# Patient Record
Sex: FEMALE | Race: WHITE | NOT HISPANIC OR LATINO | Employment: OTHER | URBAN - METROPOLITAN AREA
[De-identification: names, ages, dates, MRNs, and addresses within clinical notes are randomized per-mention and may not be internally consistent; named-entity substitution may affect disease eponyms.]

---

## 2018-10-01 DIAGNOSIS — R29.898 BILATERAL LEG WEAKNESS: Primary | ICD-10-CM

## 2019-01-12 ENCOUNTER — HOSPITAL ENCOUNTER (EMERGENCY)
Facility: HOSPITAL | Age: 84
Discharge: PSYCHIATRIC OR SUBSTANCE USE FACILITY | End: 2019-01-13
Attending: EMERGENCY MEDICINE
Payer: COMMERCIAL

## 2019-01-12 DIAGNOSIS — F22 DELUSIONS: ICD-10-CM

## 2019-01-12 DIAGNOSIS — Z00.8 MEDICAL CLEARANCE FOR PSYCHIATRIC ADMISSION: ICD-10-CM

## 2019-01-12 DIAGNOSIS — F03.91 DEMENTIA WITH BEHAVIORAL DISTURBANCE, UNSPECIFIED DEMENTIA TYPE: Primary | ICD-10-CM

## 2019-01-12 LAB
BASOPHILS # BLD AUTO: 0.03 K/UL
BASOPHILS NFR BLD: 0.4 %
DIFFERENTIAL METHOD: ABNORMAL
EOSINOPHIL # BLD AUTO: 0.3 K/UL
EOSINOPHIL NFR BLD: 4.3 %
ERYTHROCYTE [DISTWIDTH] IN BLOOD BY AUTOMATED COUNT: 13.1 %
GLUCOSE SERPL-MCNC: 125 MG/DL (ref 70–110)
HCT VFR BLD AUTO: 37.1 %
HGB BLD-MCNC: 12.1 G/DL
LYMPHOCYTES # BLD AUTO: 1.8 K/UL
LYMPHOCYTES NFR BLD: 26.4 %
MCH RBC QN AUTO: 31 PG
MCHC RBC AUTO-ENTMCNC: 32.6 G/DL
MCV RBC AUTO: 95 FL
MONOCYTES # BLD AUTO: 1 K/UL
MONOCYTES NFR BLD: 15.3 %
NEUTROPHILS # BLD AUTO: 3.6 K/UL
NEUTROPHILS NFR BLD: 53.6 %
PLATELET # BLD AUTO: 253 K/UL
PMV BLD AUTO: 10.1 FL
POCT GLUCOSE: 125 MG/DL (ref 70–110)
RBC # BLD AUTO: 3.9 M/UL
WBC # BLD AUTO: 6.67 K/UL

## 2019-01-12 PROCEDURE — 80053 COMPREHEN METABOLIC PANEL: CPT

## 2019-01-12 PROCEDURE — 93010 EKG 12-LEAD: ICD-10-PCS | Mod: ,,, | Performed by: INTERNAL MEDICINE

## 2019-01-12 PROCEDURE — 93010 ELECTROCARDIOGRAM REPORT: CPT | Mod: ,,, | Performed by: INTERNAL MEDICINE

## 2019-01-12 PROCEDURE — 84443 ASSAY THYROID STIM HORMONE: CPT

## 2019-01-12 PROCEDURE — 85025 COMPLETE CBC W/AUTO DIFF WBC: CPT

## 2019-01-12 PROCEDURE — 82962 GLUCOSE BLOOD TEST: CPT

## 2019-01-12 PROCEDURE — 93005 ELECTROCARDIOGRAM TRACING: CPT

## 2019-01-12 PROCEDURE — 99285 EMERGENCY DEPT VISIT HI MDM: CPT

## 2019-01-12 PROCEDURE — 80320 DRUG SCREEN QUANTALCOHOLS: CPT

## 2019-01-13 VITALS
BODY MASS INDEX: 29.26 KG/M2 | HEART RATE: 57 BPM | DIASTOLIC BLOOD PRESSURE: 60 MMHG | WEIGHT: 159 LBS | RESPIRATION RATE: 18 BRPM | OXYGEN SATURATION: 97 % | HEIGHT: 62 IN | SYSTOLIC BLOOD PRESSURE: 156 MMHG | TEMPERATURE: 98 F

## 2019-01-13 LAB
ALBUMIN SERPL BCP-MCNC: 3.5 G/DL
ALP SERPL-CCNC: 167 U/L
ALT SERPL W/O P-5'-P-CCNC: 13 U/L
AMPHET+METHAMPHET UR QL: NEGATIVE
ANION GAP SERPL CALC-SCNC: 12 MMOL/L
AST SERPL-CCNC: 17 U/L
BACTERIA #/AREA URNS HPF: ABNORMAL /HPF
BARBITURATES UR QL SCN>200 NG/ML: NEGATIVE
BENZODIAZ UR QL SCN>200 NG/ML: NEGATIVE
BILIRUB SERPL-MCNC: 0.4 MG/DL
BILIRUB UR QL STRIP: NEGATIVE
BUN SERPL-MCNC: 22 MG/DL
BZE UR QL SCN: NEGATIVE
CALCIUM SERPL-MCNC: 9.7 MG/DL
CANNABINOIDS UR QL SCN: NEGATIVE
CHLORIDE SERPL-SCNC: 102 MMOL/L
CLARITY UR: CLEAR
CO2 SERPL-SCNC: 25 MMOL/L
COLOR UR: YELLOW
CREAT SERPL-MCNC: 0.9 MG/DL
CREAT UR-MCNC: 58.4 MG/DL
EST. GFR  (AFRICAN AMERICAN): >60 ML/MIN/1.73 M^2
EST. GFR  (NON AFRICAN AMERICAN): 57 ML/MIN/1.73 M^2
ETHANOL SERPL-MCNC: <10 MG/DL
GLUCOSE SERPL-MCNC: 126 MG/DL
GLUCOSE UR QL STRIP: NEGATIVE
HGB UR QL STRIP: NEGATIVE
KETONES UR QL STRIP: NEGATIVE
LEUKOCYTE ESTERASE UR QL STRIP: ABNORMAL
METHADONE UR QL SCN>300 NG/ML: NEGATIVE
MICROSCOPIC COMMENT: ABNORMAL
NITRITE UR QL STRIP: NEGATIVE
OPIATES UR QL SCN: NEGATIVE
PCP UR QL SCN>25 NG/ML: NEGATIVE
PH UR STRIP: 6 [PH] (ref 5–8)
POTASSIUM SERPL-SCNC: 4 MMOL/L
PROT SERPL-MCNC: 6 G/DL
PROT UR QL STRIP: NEGATIVE
SODIUM SERPL-SCNC: 139 MMOL/L
SP GR UR STRIP: 1.01 (ref 1–1.03)
SQUAMOUS #/AREA URNS HPF: 5 /HPF
TOXICOLOGY INFORMATION: NORMAL
TSH SERPL DL<=0.005 MIU/L-ACNC: 2.23 UIU/ML
URN SPEC COLLECT METH UR: ABNORMAL
UROBILINOGEN UR STRIP-ACNC: NEGATIVE EU/DL
WBC #/AREA URNS HPF: 20 /HPF (ref 0–5)
WBC CLUMPS URNS QL MICRO: ABNORMAL

## 2019-01-13 PROCEDURE — 81000 URINALYSIS NONAUTO W/SCOPE: CPT | Mod: 59

## 2019-01-13 PROCEDURE — 80307 DRUG TEST PRSMV CHEM ANLYZR: CPT

## 2019-01-13 PROCEDURE — 87086 URINE CULTURE/COLONY COUNT: CPT

## 2019-01-13 RX ORDER — LATANOPROST/PF 0.005 %
DROPS OPHTHALMIC (EYE) NIGHTLY
COMMUNITY

## 2019-01-13 RX ORDER — DULOXETIN HYDROCHLORIDE 20 MG/1
20 CAPSULE, DELAYED RELEASE ORAL DAILY
COMMUNITY

## 2019-01-13 RX ORDER — LORATADINE 10 MG/1
10 TABLET ORAL DAILY
COMMUNITY

## 2019-01-13 RX ORDER — RISPERIDONE 0.25 MG/1
TABLET ORAL NIGHTLY
COMMUNITY

## 2019-01-13 RX ORDER — METOPROLOL SUCCINATE 100 MG/1
100 TABLET, EXTENDED RELEASE ORAL DAILY
COMMUNITY

## 2019-01-13 RX ORDER — DIFLUPREDNATE OPHTHALMIC 0.5 MG/ML
1 EMULSION OPHTHALMIC
COMMUNITY

## 2019-01-13 RX ORDER — ROPINIROLE 1 MG/1
1 TABLET, FILM COATED ORAL 2 TIMES DAILY
COMMUNITY

## 2019-01-13 RX ORDER — LOSARTAN POTASSIUM 100 MG/1
100 TABLET ORAL DAILY
COMMUNITY

## 2019-01-13 NOTE — ED NOTES
Speaking to daughter Ann on phone. Medications reviewed with her. POC reviewed with daughter. Will send updated medication rec with patient when transported to [facility].     She asked to call son- Branden Mira at [phone] if unable to reach her. 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 Please call her son, Branden Mira if unable to reach her via phone.

## 2019-01-13 NOTE — ED NOTES
PATIENT WAS TRANSFERRED TO [facility] VIA SPD AND ESCORTED OUT BY SECURITY

## 2019-01-13 NOTE — ED NOTES
Admit packet faxed to [facility], [facility], [facility], and [facility],Admit packet faxed to [facility], [facility], [facility] [place], [facility], [facility], [facility], [facility], [facility], , [facility], [facility], [facility], [facility], [facility], [facility], [facility], [facility], [facility], [facility], [facility], [facility], [facility], [facility], [facility], [facility], [place] [facility],and [facility].  Waiting for response.

## 2019-01-13 NOTE — ED NOTES
Hx obtained from daughter, Ann on phone. Pt has been walking around at home without her walker and has been increasingly agitated and hallucinating-declining over the past several months. Pt wears hearing aids- hearing aids were taken home. 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 Speak into right ear, right ear is better ear. 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

## 2019-01-13 NOTE — ED NOTES
Report received from MARINA Sanders. Pt is alert and oriented at this time, calm and appropriate. Kelly, sitting 1:1 direct observation. Side rails up x2, call bell in place. Pt is medically cleared so she will be removed from cardiac monitor. Pt asked for breakfast, advised that tray would be coming and she can eat. Advised pt that she needs to be placed and we are awaiting placement at this time.

## 2019-01-13 NOTE — ED PROVIDER NOTES
Encounter Date: 1/12/2019       History     Chief Complaint   Patient presents with    Altered Mental Status     pt has hx dementia; reports last night from about [id number] in the morning the pt was walking around the house being loud and verbally abusive; when she woke up she was calm; at about 1800 this evening she began to show signs of aggression and altered mental status; daughter reports that the pt's grandson was able to give the pt her meds shortly after the start of her symptoms this evening; pt presents calm and cooperative at triage     Patient brought in for evaluation for psychiatric clearance for placement in a geriatric psych facility.  Patient has a history of dementia.  She has been having trouble with agitation for last 2 days.  She has not been sleeping at night.  She is currently cough because her grandson was able to get her to take her Risperdal tonight.  She has been refusing to take medications last few days.  Patient does not have any specific complaints. There has been no reports of fever, cough, abdominal pain, chest pain, urinary symptoms, or trauma. She denies headache.          Review of patient's allergies indicates:   Allergen Reactions    Qualaquin [quinine sulfate] Hives     Past Medical History:   Diagnosis Date    Asthma     Depression     Hyperlipidemia     Hypertension     Pneumonia     Renal disorder     Restless leg syndrome     Thyroid disease      Past Surgical History:   Procedure Laterality Date    CARPAL TUNNEL RELEASE      CHOLECYSTECTOMY      EGD (ESOPHAGOGASTRODUODENOSCOPY) Left 4/17/2013    [facility]    EYE SURGERY      Cataract    HYSTERECTOMY      TONSILLECTOMY       No family history on file.  Social History     Tobacco Use    Smoking status: Never Smoker   Substance Use Topics    Alcohol use: No    Drug use: No     Review of Systems   Constitutional: Negative for chills and fever.   HENT: Negative for  congestion, rhinorrhea and trouble swallowing.    Eyes: Negative for visual disturbance.   Respiratory: Negative for cough, shortness of breath and stridor.    Cardiovascular: Negative for chest pain.   Gastrointestinal: Negative for abdominal pain, blood in stool, diarrhea, nausea and vomiting.        No melena.   Genitourinary: Negative for dysuria, flank pain, hematuria and urgency.   Skin: Negative for rash and wound.   Neurological: Negative for dizziness, tremors, seizures, syncope, facial asymmetry, speech difficulty, weakness, numbness and headaches.   Psychiatric/Behavioral: Positive for agitation, behavioral problems, confusion and sleep disturbance. Negative for hallucinations and suicidal ideas.   All other systems reviewed and are negative.      Physical Exam     Initial Vitals [01/12/19 2313]   BP Pulse Resp Temp SpO2   (!) 199/78 64 19 98.1 °F (36.7 °C) 99 %      MAP       --         Physical Exam    Nursing note and vitals reviewed.  Constitutional: She appears well-developed and well-nourished. She is not diaphoretic. No distress.   HENT:   Head: Normocephalic and atraumatic.   Nose: Nose normal.   Mouth/Throat: Oropharynx is clear and moist.   Eyes: Conjunctivae and EOM are normal. Pupils are equal, round, and reactive to light. Right eye exhibits no discharge. Left eye exhibits no discharge.   Neck: Normal range of motion. Neck supple. No thyromegaly present.   Cardiovascular: Normal rate, regular rhythm and normal heart sounds.   No murmur heard.  Pulmonary/Chest: Breath sounds normal. No stridor. No respiratory distress. She has no wheezes. She has no rhonchi. She has no rales. She exhibits no tenderness.   Abdominal: Soft. She exhibits no distension and no mass. There is no tenderness. There is no rebound and no guarding.   Musculoskeletal: Normal range of motion. She exhibits no edema or tenderness.   Neurological: She is alert. She has normal strength. No cranial nerve deficit.   Patient  oriented to person and place.   Skin: Skin is warm and dry. No rash noted. No erythema.   Psychiatric: Her behavior is normal.   Patient currently calm and  cooperative.  No suicidal homicidal ideations.  Dysphoric mood.  Labile affect. No active hallucinations.  Daughter reports active delusions.         ED Course   Procedures  Labs Reviewed   CBC W/ AUTO DIFFERENTIAL   COMPREHENSIVE METABOLIC PANEL   TSH   URINALYSIS, REFLEX TO URINE CULTURE   DRUG SCREEN PANEL, URINE EMERGENCY   ALCOHOL,MEDICAL (ETHANOL)   POCT GLUCOSE MONITORING CONTINUOUS     EKG Readings: (Independently Interpreted)   Initial Reading: No STEMI. Rhythm: Sinus Bradycardia. Heart Rate: 56. Ectopy: No Ectopy. Conduction: 1st Degree AV Block. ST Segments: Normal ST Segments. T Waves: Normal. Axis: Normal.       Imaging Results          CT Head Without Contrast (In process)                  Medical Decision Making:   Differential Diagnosis:   Dementia, delirium.  Clinical Tests:   Lab Tests: Reviewed  The following lab test(s) were unremarkable: CBC, CMP and Urinalysis  Medical Tests: Reviewed  ED Management:  Patient medically cleared for psychiatric placement.                      Clinical Impression:   The primary encounter diagnosis was Dementia with behavioral disturbance, unspecified dementia type. Diagnoses of Medical clearance for psychiatric admission and Delusions were also pertinent to this visit.      Disposition:   Disposition: Transferred  Condition: Stable                        Chad Ramirez MD  01/13/19 3416

## 2019-01-13 NOTE — ED NOTES
Patient accepted by [facility] at [facility] ([address] [place] [address]) for the service of .Report to be called to [phone].

## 2019-01-13 NOTE — ED TRIAGE NOTES
Pt reports to ED with reports of aggressive behavior towards family members. Pt was recently diagnosed with dementia and hospitalized at [facility]. Pts daughter at bedside and reports pt is uncooperative with her care at home, refusing to go to the bathroom or take her medication. Pt appears to be upset with her daughter.

## 2019-01-13 NOTE — PSYCH
PEC received by CPT for placement. Packet will be faxed to [facility] facilities for acceptance. ED will be notified with report information thereafter

## 2019-01-15 LAB — BACTERIA UR CULT: NORMAL

## 2019-01-19 ENCOUNTER — HOSPITAL ENCOUNTER (EMERGENCY)
Facility: OTHER | Age: 84
Discharge: HOME | End: 2019-01-19
Attending: EMERGENCY MEDICINE
Payer: MEDICARE

## 2019-01-19 VITALS
WEIGHT: 132 LBS | HEIGHT: 60 IN | HEART RATE: 61 BPM | BODY MASS INDEX: 25.91 KG/M2 | RESPIRATION RATE: 18 BRPM | TEMPERATURE: 98 F | OXYGEN SATURATION: 97 % | SYSTOLIC BLOOD PRESSURE: 133 MMHG | DIASTOLIC BLOOD PRESSURE: 60 MMHG

## 2019-01-19 DIAGNOSIS — R60.9 SWELLING: ICD-10-CM

## 2019-01-19 DIAGNOSIS — M25.532 WRIST PAIN, ACUTE, LEFT: Primary | ICD-10-CM

## 2019-01-19 DIAGNOSIS — M25.439 WRIST SWELLING: ICD-10-CM

## 2019-01-19 PROCEDURE — 99283 EMERGENCY DEPT VISIT LOW MDM: CPT

## 2019-01-19 RX ORDER — CEPHALEXIN 500 MG/1
500 CAPSULE ORAL EVERY 12 HOURS
Qty: 20 CAPSULE | Refills: 0 | Status: ON HOLD | OUTPATIENT
Start: 2019-01-19 | End: 2019-01-25 | Stop reason: HOSPADM

## 2019-01-19 NOTE — ED PROVIDER NOTES
Encounter Date: 1/19/2019       History     Chief Complaint   Patient presents with    Wrist Pain     Reports left wrist pain and swelling, noticed yesterday after coming home from [facility]      89-year-old female with history of asthma, pneumonia, hypertension, depression, arthritis, thyroid disease, renal disorder, hyperlipidemia, restless leg syndrome and dementia presents to the emergency department with complaints of left wrist pain, swelling and redness.  Patient's daughter states that she picked her up yesterday from [facility] in [place].  Unsure of any injury. Patient states that she thinks it was injured secondary to her be mean to people.  She denies any numbness or weakness.  Patient's daughter states that she complained about it all day yesterday and again this morning.  She denies any fever, chills or streaking.  Denies history of gout.  No current treatment.      The history is provided by the patient and a relative.     Review of patient's allergies indicates:   Allergen Reactions    Qualaquin [quinine sulfate] Hives     Past Medical History:   Diagnosis Date    Asthma     Depression     Hyperlipidemia     Hypertension     Pneumonia     Renal disorder     Restless leg syndrome     Thyroid disease      Past Surgical History:   Procedure Laterality Date    CARPAL TUNNEL RELEASE      CHOLECYSTECTOMY      dementia      EGD (ESOPHAGOGASTRODUODENOSCOPY) Left 4/17/2013    [facility] ENDO    EYE SURGERY      Cataract    HYSTERECTOMY      TONSILLECTOMY       No family history on file.  Social History     Tobacco Use    Smoking status: Never Smoker    Smokeless tobacco: Never Used   Substance Use Topics    Alcohol use: No    Drug use: No     Review of Systems   Constitutional: Negative for chills and fever.   HENT: Negative for sore throat.    Respiratory: Negative for shortness of breath.    Cardiovascular: Negative for chest pain.   Gastrointestinal:  Negative for nausea and vomiting.   Musculoskeletal: Positive for arthralgias and joint swelling. Negative for back pain.        Left wrist pain, swelling and redness   Skin: Positive for color change. Negative for rash and wound.   Neurological: Negative for weakness and numbness.   Hematological: Does not bruise/bleed easily.       Physical Exam     Initial Vitals [01/19/19 1027]   BP Pulse Resp Temp SpO2   133/60 61 18 98 °F (36.7 °C) 97 %      MAP       --         Physical Exam    Nursing note and vitals reviewed.  Constitutional: Vital signs are normal. She appears well-developed and well-nourished.  Non-toxic appearance. No distress.   HENT:   Head: Normocephalic and atraumatic.   Right Ear: External ear normal.   Left Ear: External ear normal.   Nose: Nose normal.   Eyes: Conjunctivae and lids are normal. No scleral icterus.   Neck: Phonation normal.   Cardiovascular: Normal rate, regular rhythm and intact distal pulses.   Abdominal: Normal appearance.   Musculoskeletal: Normal range of motion.   No obvious deformities, moving all extremities  Left wrist- swelling, erythema and mild warmth noted to the left wrist.  Pain with range of motion.  She does have some mild tenderness to palpation.  Intact distal pulses with no sensory deficits.  Capillary refill less than 3 sec.   strength 5/5.  No bony deformity noted.   Neurological: She is alert and oriented to person, place, and time. She has normal strength. No sensory deficit. She exhibits normal muscle tone.   Skin: Skin is warm, dry and intact. Capillary refill takes less than 2 seconds. No abrasion, no bruising, no ecchymosis, no laceration, no lesion, no rash and no abscess noted. There is erythema (Left wrist).   Psychiatric: She has a normal mood and affect. Her speech is normal and behavior is normal. Judgment normal. Cognition and memory are normal.         ED Course   Procedures  Labs Reviewed - No data to display       Imaging Results           X-Ray Wrist Complete Left (Final result)  Result time 01/19/19 11:25:05    Final result by Sunny Taylor III, MD (01/19/19 11:25:05)                 Impression:      See above      Electronically signed by: Sunny Taylor MD  Date:    01/19/2019  Time:    11:25             Narrative:    EXAMINATION:  XR WRIST COMPLETE 3 VIEWS LEFT    CLINICAL HISTORY:  Edema, unspecified    FINDINGS:  No fracture dislocation bone destruction seen.  There is mild-moderate DJD.                                 Medical Decision Making:   History:   I obtained history from: someone other than patient.       <> Summary of History: Daughter  Old Medical Records: I decided to obtain old medical records.  Initial Assessment:   89-year-old female with complaints consistent with left wrist pain with swelling and erythema.  Vital signs stable, afebrile, neurovascularly intact.  She is alert and healthy and nontoxic appearing.  She is in no apparent distress. Exam documented above.  Patient has erythema, warmth, edema and pain to the left dorsal wrist.  No erythema noted to the volar aspect of the wrist.  She reports pain with range of motion and palpation. Reports possible injury however patient has history of dementia.  Patient's daughter states that she was not present at time of reported possible injury. She states that she was complaining of pain yesterday but had no redness at the time or swelling.  Clinical Tests:   Radiological Study: Reviewed  ED Management:  X-ray was obtained from triage with no evidence of fracture dislocation.  Discussed with the attending physician who also evaluated patient.  We do not feel that patient would tolerate joint aspiration at this time.  Will treat conservatively with Ace wrap, ice and Keflex.  She is urged close follow-up with her doctor in the next 48 hr or return to emergency department for any worsening signs or symptoms.  They state understanding and agree with this plan.  This is the extent of  patient's complaints today.  Other:   I have discussed this case with another health care provider.       <> Summary of the Discussion: John  This note was created using [employer] dictation.  There may be typographical errors secondary to dictation.                        Clinical Impression:     1. Wrist pain, acute, left    2. Swelling    3. Wrist swelling            Disposition:   Disposition: Discharged  Condition: Stable                        Earnestine Cheng PA-C  01/19/19 1218

## 2019-01-20 ENCOUNTER — HOSPITAL ENCOUNTER (OUTPATIENT)
Facility: HOSPITAL | Age: 84
Discharge: NURSING, REHAB OR LONG-TERM CARE | End: 2019-01-25
Attending: EMERGENCY MEDICINE | Admitting: HOSPITALIST
Payer: COMMERCIAL

## 2019-01-20 DIAGNOSIS — R55 SYNCOPE: ICD-10-CM

## 2019-01-20 DIAGNOSIS — R00.0 TACHYCARDIA: ICD-10-CM

## 2019-01-20 DIAGNOSIS — R55 SYNCOPE, UNSPECIFIED SYNCOPE TYPE: Primary | ICD-10-CM

## 2019-01-20 DIAGNOSIS — R11.10 VOMITING: ICD-10-CM

## 2019-01-20 PROBLEM — M25.532 WRIST PAIN, ACUTE, LEFT: Status: ACTIVE | Noted: 2019-01-20

## 2019-01-20 PROBLEM — E03.9 HYPOTHYROID: Status: ACTIVE | Noted: 2019-01-20

## 2019-01-20 PROBLEM — E11.9 TYPE 2 DIABETES MELLITUS: Status: ACTIVE | Noted: 2019-01-20

## 2019-01-20 PROBLEM — H26.9 CATARACTS, BILATERAL: Status: ACTIVE | Noted: 2019-01-20

## 2019-01-20 PROBLEM — F03.90 DEMENTIA: Status: ACTIVE | Noted: 2019-01-20

## 2019-01-20 LAB
ALBUMIN SERPL BCP-MCNC: 3.1 G/DL
ALP SERPL-CCNC: 165 U/L
ALT SERPL W/O P-5'-P-CCNC: 11 U/L
ANION GAP SERPL CALC-SCNC: 12 MMOL/L
ASCENDING AORTA: 2.5 CM
AST SERPL-CCNC: 17 U/L
AV INDEX (PROSTH): 0.7
AV MEAN GRADIENT: 8.15 MMHG
AV PEAK GRADIENT: 14.9 MMHG
AV VALVE AREA: 2.1 CM2
AV VELOCITY RATIO: 0.7
BASOPHILS # BLD AUTO: 0.02 K/UL
BASOPHILS NFR BLD: 0.2 %
BILIRUB SERPL-MCNC: 0.8 MG/DL
BILIRUB UR QL STRIP: NEGATIVE
BNP SERPL-MCNC: 69 PG/ML
BSA FOR ECHO PROCEDURE: 1.59 M2
BUN SERPL-MCNC: 26 MG/DL
CALCIUM SERPL-MCNC: 10.6 MG/DL
CHLORIDE SERPL-SCNC: 100 MMOL/L
CLARITY UR REFRACT.AUTO: CLEAR
CO2 SERPL-SCNC: 24 MMOL/L
COLOR UR AUTO: YELLOW
CREAT SERPL-MCNC: 0.8 MG/DL
CV ECHO LV RWT: 0.47 CM
DIFFERENTIAL METHOD: ABNORMAL
DOP CALC AO PEAK VEL: 1.93 M/S
DOP CALC AO VTI: 39.29 CM
DOP CALC LVOT AREA: 2.98 CM2
DOP CALC LVOT DIAMETER: 1.95 CM
DOP CALC LVOT PEAK VEL: 1.36 M/S
DOP CALC LVOT STROKE VOLUME: 82.41 CM3
DOP CALCLVOT PEAK VEL VTI: 27.61 CM
E WAVE DECELERATION TIME: 213.16 MSEC
E/A RATIO: 1.05
E/E' RATIO: 13.14
ECHO LV POSTERIOR WALL: 0.84 CM (ref 0.6–1.1)
EOSINOPHIL # BLD AUTO: 0 K/UL
EOSINOPHIL NFR BLD: 0.3 %
ERYTHROCYTE [DISTWIDTH] IN BLOOD BY AUTOMATED COUNT: 12.5 %
EST. GFR  (AFRICAN AMERICAN): >60 ML/MIN/1.73 M^2
EST. GFR  (NON AFRICAN AMERICAN): >60 ML/MIN/1.73 M^2
FRACTIONAL SHORTENING: 30 % (ref 28–44)
GLUCOSE SERPL-MCNC: 161 MG/DL
GLUCOSE UR QL STRIP: NEGATIVE
HCT VFR BLD AUTO: 38.1 %
HGB BLD-MCNC: 11.9 G/DL
HGB UR QL STRIP: NEGATIVE
IMM GRANULOCYTES # BLD AUTO: 0.08 K/UL
IMM GRANULOCYTES NFR BLD AUTO: 0.8 %
INTERVENTRICULAR SEPTUM: 0.6 CM (ref 0.6–1.1)
IVRT: 0.08 MSEC
KETONES UR QL STRIP: ABNORMAL
LA MAJOR: 5.3 CM
LA MINOR: 5.3 CM
LA WIDTH: 4.4 CM
LEFT ATRIUM SIZE: 4 CM
LEFT ATRIUM VOLUME INDEX: 50.7 ML/M2
LEFT ATRIUM VOLUME: 79.29 CM3
LEFT INTERNAL DIMENSION IN SYSTOLE: 2.49 CM (ref 2.1–4)
LEFT VENTRICLE DIASTOLIC VOLUME INDEX: 33.77 ML/M2
LEFT VENTRICLE DIASTOLIC VOLUME: 52.83 ML
LEFT VENTRICLE MASS INDEX: 42.9 G/M2
LEFT VENTRICLE SYSTOLIC VOLUME INDEX: 14.1 ML/M2
LEFT VENTRICLE SYSTOLIC VOLUME: 21.99 ML
LEFT VENTRICULAR INTERNAL DIMENSION IN DIASTOLE: 3.56 CM (ref 3.5–6)
LEFT VENTRICULAR MASS: 67.06 G
LEUKOCYTE ESTERASE UR QL STRIP: NEGATIVE
LV LATERAL E/E' RATIO: 13.14
LV SEPTAL E/E' RATIO: 13.14
LYMPHOCYTES # BLD AUTO: 0.4 K/UL
LYMPHOCYTES NFR BLD: 4.1 %
MCH RBC QN AUTO: 29.6 PG
MCHC RBC AUTO-ENTMCNC: 31.2 G/DL
MCV RBC AUTO: 95 FL
MONOCYTES # BLD AUTO: 0.8 K/UL
MONOCYTES NFR BLD: 8.3 %
MV PEAK A VEL: 0.88 M/S
MV PEAK E VEL: 0.92 M/S
NEUTROPHILS # BLD AUTO: 8.4 K/UL
NEUTROPHILS NFR BLD: 86.3 %
NITRITE UR QL STRIP: NEGATIVE
NRBC BLD-RTO: 0 /100 WBC
PH UR STRIP: 6 [PH] (ref 5–8)
PISA TR MAX VEL: 3.02 M/S
PLATELET # BLD AUTO: 235 K/UL
PMV BLD AUTO: 10.8 FL
POTASSIUM SERPL-SCNC: 4.2 MMOL/L
PROT SERPL-MCNC: 7 G/DL
PROT UR QL STRIP: NEGATIVE
PULM VEIN S/D RATIO: 2.16
PV PEAK D VEL: 0.44 M/S
PV PEAK S VEL: 0.95 M/S
RA MAJOR: 5.15 CM
RA PRESSURE: 3 MMHG
RA WIDTH: 3.68 CM
RBC # BLD AUTO: 4.02 M/UL
RIGHT VENTRICULAR END-DIASTOLIC DIMENSION: 3.55 CM
RV TISSUE DOPPLER FREE WALL SYSTOLIC VELOCITY 1 (APICAL 4 CHAMBER VIEW): 19.35 M/S
SINUS: 3.31 CM
SODIUM SERPL-SCNC: 136 MMOL/L
SP GR UR STRIP: 1.02 (ref 1–1.03)
STJ: 2.67 CM
TDI LATERAL: 0.07
TDI SEPTAL: 0.07
TDI: 0.07
TR MAX PG: 36.48 MMHG
TRICUSPID ANNULAR PLANE SYSTOLIC EXCURSION: 1.61 CM
TROPONIN I SERPL DL<=0.01 NG/ML-MCNC: <0.006 NG/ML
TROPONIN I SERPL DL<=0.01 NG/ML-MCNC: <0.006 NG/ML
TV REST PULMONARY ARTERY PRESSURE: 39 MMHG
URN SPEC COLLECT METH UR: ABNORMAL
WBC # BLD AUTO: 9.68 K/UL

## 2019-01-20 PROCEDURE — 93010 EKG 12-LEAD: ICD-10-PCS | Mod: ,,, | Performed by: INTERNAL MEDICINE

## 2019-01-20 PROCEDURE — G0378 HOSPITAL OBSERVATION PER HR: HCPCS

## 2019-01-20 PROCEDURE — 99219 PR INITIAL OBSERVATION CARE,LEVL II: ICD-10-PCS | Mod: ,,, | Performed by: PHYSICIAN ASSISTANT

## 2019-01-20 PROCEDURE — 25000003 PHARM REV CODE 250: Performed by: PHYSICIAN ASSISTANT

## 2019-01-20 PROCEDURE — 99219 PR INITIAL OBSERVATION CARE,LEVL II: CPT | Mod: ,,, | Performed by: PHYSICIAN ASSISTANT

## 2019-01-20 PROCEDURE — 80053 COMPREHEN METABOLIC PANEL: CPT

## 2019-01-20 PROCEDURE — 85025 COMPLETE CBC W/AUTO DIFF WBC: CPT

## 2019-01-20 PROCEDURE — 93010 ELECTROCARDIOGRAM REPORT: CPT | Mod: ,,, | Performed by: INTERNAL MEDICINE

## 2019-01-20 PROCEDURE — 99285 EMERGENCY DEPT VISIT HI MDM: CPT | Mod: 25

## 2019-01-20 PROCEDURE — 96361 HYDRATE IV INFUSION ADD-ON: CPT

## 2019-01-20 PROCEDURE — 63600175 PHARM REV CODE 636 W HCPCS: Performed by: PHYSICIAN ASSISTANT

## 2019-01-20 PROCEDURE — 82962 GLUCOSE BLOOD TEST: CPT

## 2019-01-20 PROCEDURE — 96374 THER/PROPH/DIAG INJ IV PUSH: CPT | Mod: 59

## 2019-01-20 PROCEDURE — 99284 PR EMERGENCY DEPT VISIT,LEVEL IV: ICD-10-PCS | Mod: ,,, | Performed by: PHYSICIAN ASSISTANT

## 2019-01-20 PROCEDURE — A4216 STERILE WATER/SALINE, 10 ML: HCPCS | Performed by: PHYSICIAN ASSISTANT

## 2019-01-20 PROCEDURE — 83880 ASSAY OF NATRIURETIC PEPTIDE: CPT

## 2019-01-20 PROCEDURE — 99284 EMERGENCY DEPT VISIT MOD MDM: CPT | Mod: ,,, | Performed by: PHYSICIAN ASSISTANT

## 2019-01-20 PROCEDURE — 81003 URINALYSIS AUTO W/O SCOPE: CPT

## 2019-01-20 PROCEDURE — 93005 ELECTROCARDIOGRAM TRACING: CPT

## 2019-01-20 PROCEDURE — 84484 ASSAY OF TROPONIN QUANT: CPT | Mod: 91

## 2019-01-20 RX ORDER — CEPHALEXIN 500 MG/1
500 CAPSULE ORAL
Status: COMPLETED | OUTPATIENT
Start: 2019-01-20 | End: 2019-01-20

## 2019-01-20 RX ORDER — POTASSIUM CHLORIDE 750 MG/1
10 CAPSULE, EXTENDED RELEASE ORAL DAILY
Status: DISCONTINUED | OUTPATIENT
Start: 2019-01-20 | End: 2019-01-25 | Stop reason: HOSPADM

## 2019-01-20 RX ORDER — LEVOTHYROXINE SODIUM 50 UG/1
50 TABLET ORAL DAILY
Status: DISCONTINUED | OUTPATIENT
Start: 2019-01-20 | End: 2019-01-25 | Stop reason: HOSPADM

## 2019-01-20 RX ORDER — PANTOPRAZOLE SODIUM 40 MG/1
40 TABLET, DELAYED RELEASE ORAL 2 TIMES DAILY
Status: DISCONTINUED | OUTPATIENT
Start: 2019-01-20 | End: 2019-01-25 | Stop reason: HOSPADM

## 2019-01-20 RX ORDER — IBUPROFEN 200 MG
24 TABLET ORAL
Status: DISCONTINUED | OUTPATIENT
Start: 2019-01-20 | End: 2019-01-25 | Stop reason: HOSPADM

## 2019-01-20 RX ORDER — ROPINIROLE 0.25 MG/1
1 TABLET, FILM COATED ORAL 2 TIMES DAILY
Status: DISCONTINUED | OUTPATIENT
Start: 2019-01-20 | End: 2019-01-25 | Stop reason: HOSPADM

## 2019-01-20 RX ORDER — DULOXETIN HYDROCHLORIDE 20 MG/1
20 CAPSULE, DELAYED RELEASE ORAL DAILY
Status: DISCONTINUED | OUTPATIENT
Start: 2019-01-20 | End: 2019-01-25 | Stop reason: HOSPADM

## 2019-01-20 RX ORDER — IBUPROFEN 200 MG
16 TABLET ORAL
Status: DISCONTINUED | OUTPATIENT
Start: 2019-01-20 | End: 2019-01-25 | Stop reason: HOSPADM

## 2019-01-20 RX ORDER — ACETAMINOPHEN 325 MG/1
650 TABLET ORAL
Status: COMPLETED | OUTPATIENT
Start: 2019-01-20 | End: 2019-01-20

## 2019-01-20 RX ORDER — CEPHALEXIN 500 MG/1
500 CAPSULE ORAL EVERY 12 HOURS
Status: COMPLETED | OUTPATIENT
Start: 2019-01-20 | End: 2019-01-25

## 2019-01-20 RX ORDER — LANOLIN ALCOHOL/MO/W.PET/CERES
400 CREAM (GRAM) TOPICAL DAILY
Status: DISCONTINUED | OUTPATIENT
Start: 2019-01-20 | End: 2019-01-25 | Stop reason: HOSPADM

## 2019-01-20 RX ORDER — BISACODYL 10 MG
10 SUPPOSITORY, RECTAL RECTAL DAILY PRN
Status: DISCONTINUED | OUTPATIENT
Start: 2019-01-20 | End: 2019-01-25 | Stop reason: HOSPADM

## 2019-01-20 RX ORDER — FERROUS SULFATE 325(65) MG
325 TABLET, DELAYED RELEASE (ENTERIC COATED) ORAL
Status: DISCONTINUED | OUTPATIENT
Start: 2019-01-21 | End: 2019-01-25 | Stop reason: HOSPADM

## 2019-01-20 RX ORDER — ONDANSETRON 8 MG/1
8 TABLET, ORALLY DISINTEGRATING ORAL EVERY 8 HOURS PRN
Status: DISCONTINUED | OUTPATIENT
Start: 2019-01-20 | End: 2019-01-25 | Stop reason: HOSPADM

## 2019-01-20 RX ORDER — RISPERIDONE 0.25 MG/1
0.25 TABLET ORAL NIGHTLY
Status: DISCONTINUED | OUTPATIENT
Start: 2019-01-20 | End: 2019-01-25 | Stop reason: HOSPADM

## 2019-01-20 RX ORDER — POLYETHYLENE GLYCOL 3350 17 G/17G
17 POWDER, FOR SOLUTION ORAL DAILY
Status: DISCONTINUED | OUTPATIENT
Start: 2019-01-20 | End: 2019-01-25 | Stop reason: HOSPADM

## 2019-01-20 RX ORDER — ACETAMINOPHEN 325 MG/1
650 TABLET ORAL EVERY 4 HOURS PRN
Status: DISCONTINUED | OUTPATIENT
Start: 2019-01-20 | End: 2019-01-25 | Stop reason: HOSPADM

## 2019-01-20 RX ORDER — HEPARIN SODIUM 5000 [USP'U]/ML
5000 INJECTION, SOLUTION INTRAVENOUS; SUBCUTANEOUS EVERY 8 HOURS
Status: DISCONTINUED | OUTPATIENT
Start: 2019-01-20 | End: 2019-01-25 | Stop reason: HOSPADM

## 2019-01-20 RX ORDER — GLUCAGON 1 MG
1 KIT INJECTION
Status: DISCONTINUED | OUTPATIENT
Start: 2019-01-20 | End: 2019-01-25 | Stop reason: HOSPADM

## 2019-01-20 RX ORDER — DIFLUPREDNATE OPHTHALMIC 0.5 MG/ML
1 EMULSION OPHTHALMIC NIGHTLY
Status: DISCONTINUED | OUTPATIENT
Start: 2019-01-20 | End: 2019-01-25 | Stop reason: HOSPADM

## 2019-01-20 RX ORDER — LOSARTAN POTASSIUM 50 MG/1
100 TABLET ORAL DAILY
Status: DISCONTINUED | OUTPATIENT
Start: 2019-01-20 | End: 2019-01-25 | Stop reason: HOSPADM

## 2019-01-20 RX ORDER — ALBUTEROL SULFATE 90 UG/1
2 AEROSOL, METERED RESPIRATORY (INHALATION) EVERY 6 HOURS PRN
Status: DISCONTINUED | OUTPATIENT
Start: 2019-01-20 | End: 2019-01-25 | Stop reason: HOSPADM

## 2019-01-20 RX ORDER — SODIUM CHLORIDE 0.9 % (FLUSH) 0.9 %
5 SYRINGE (ML) INJECTION
Status: DISCONTINUED | OUTPATIENT
Start: 2019-01-20 | End: 2019-01-25 | Stop reason: HOSPADM

## 2019-01-20 RX ORDER — ONDANSETRON 2 MG/ML
4 INJECTION INTRAMUSCULAR; INTRAVENOUS
Status: COMPLETED | OUTPATIENT
Start: 2019-01-20 | End: 2019-01-20

## 2019-01-20 RX ORDER — METOPROLOL SUCCINATE 50 MG/1
100 TABLET, EXTENDED RELEASE ORAL NIGHTLY
Status: DISCONTINUED | OUTPATIENT
Start: 2019-01-20 | End: 2019-01-25 | Stop reason: HOSPADM

## 2019-01-20 RX ORDER — INSULIN ASPART 100 [IU]/ML
0-5 INJECTION, SOLUTION INTRAVENOUS; SUBCUTANEOUS
Status: DISCONTINUED | OUTPATIENT
Start: 2019-01-20 | End: 2019-01-25 | Stop reason: HOSPADM

## 2019-01-20 RX ORDER — RAMELTEON 8 MG/1
8 TABLET ORAL NIGHTLY PRN
Status: DISCONTINUED | OUTPATIENT
Start: 2019-01-20 | End: 2019-01-25 | Stop reason: HOSPADM

## 2019-01-20 RX ADMIN — METOPROLOL SUCCINATE 100 MG: 50 TABLET, EXTENDED RELEASE ORAL at 09:01

## 2019-01-20 RX ADMIN — CEPHALEXIN 500 MG: 500 CAPSULE ORAL at 09:01

## 2019-01-20 RX ADMIN — LOSARTAN POTASSIUM 100 MG: 50 TABLET, FILM COATED ORAL at 03:01

## 2019-01-20 RX ADMIN — HEPARIN SODIUM 5000 UNITS: 5000 INJECTION, SOLUTION INTRAVENOUS; SUBCUTANEOUS at 03:01

## 2019-01-20 RX ADMIN — DULOXETINE HYDROCHLORIDE 20 MG: 20 CAPSULE, DELAYED RELEASE ORAL at 03:01

## 2019-01-20 RX ADMIN — PANTOPRAZOLE SODIUM 40 MG: 40 TABLET, DELAYED RELEASE ORAL at 09:01

## 2019-01-20 RX ADMIN — CEPHALEXIN 500 MG: 500 CAPSULE ORAL at 12:01

## 2019-01-20 RX ADMIN — ONDANSETRON 4 MG: 2 INJECTION INTRAMUSCULAR; INTRAVENOUS at 08:01

## 2019-01-20 RX ADMIN — LEVOTHYROXINE SODIUM 50 MCG: 50 TABLET ORAL at 03:01

## 2019-01-20 RX ADMIN — POTASSIUM CHLORIDE 10 MEQ: 750 CAPSULE, EXTENDED RELEASE ORAL at 03:01

## 2019-01-20 RX ADMIN — ROPINIROLE HYDROCHLORIDE 1 MG: 0.25 TABLET, FILM COATED ORAL at 09:01

## 2019-01-20 RX ADMIN — HEPARIN SODIUM 5000 UNITS: 5000 INJECTION, SOLUTION INTRAVENOUS; SUBCUTANEOUS at 09:01

## 2019-01-20 RX ADMIN — MAGNESIUM OXIDE TAB 400 MG (241.3 MG ELEMENTAL MG) 400 MG: 400 (241.3 MG) TAB at 03:01

## 2019-01-20 RX ADMIN — ACETAMINOPHEN 650 MG: 325 TABLET, FILM COATED ORAL at 11:01

## 2019-01-20 RX ADMIN — RISPERIDONE 0.25 MG: 0.25 TABLET ORAL at 09:01

## 2019-01-20 RX ADMIN — SODIUM CHLORIDE 500 ML: 0.9 INJECTION, SOLUTION INTRAVENOUS at 09:01

## 2019-01-20 RX ADMIN — Medication 5 ML: at 09:01

## 2019-01-20 NOTE — H&P
"[facility] Hospital Medicine  History & Physical    Patient Name: Gardenia Ferrer  MRN: [id number]  Admission Date: 1/20/2019  Attending Physician: Ching Argueta MD   Primary Care Provider: Nancy Reyes MD    [facility] Medicine Team: [facility] HOSP MED E Meli Yuan PA-C     Patient information was obtained from patient, caregiver / friend, past medical records and ER records.     Subjective:     Principal Problem:Syncope    Chief Complaint:   Chief Complaint   Patient presents with    Nausea     N/V after taking Keflex for Osteomyelitis.         HPI: Gardenia Ferrer is a 89F with dementia, asthma, DM2, HTN, a-fib (daughter reports one episode 4 years ago) who presents to ED for witnessed syncope. Daughter at bedside provides history. She states that yesterday the patient was brought to [facility] ED for left wrist pain associated with swelling/erythema. Wrist imaging was unremarkable and the patient was discharged with Keflex. This AM the daughter gave the pt her Keflex and shortly after heard her coughing in her room. After the coughing fit, the patient vomited and then went "unresponsive" for less than a minute. Daughter states that the patient did not endorse any complaints prior to the syncopal episode. Patient has no complaints at time of my exam. No fevers at home per daughter.    Of note, in ED caregiver is tearful when discussing her abilities to take care of the patient. At this time she is interested in placement for the patient.     VSS, afebrile without leukocytosis. EKG no new ischemic changes, trop neg x 2, BNP WNL. UA unremarkable. CXR unremarkable.    Past Medical History:   Diagnosis Date    Asthma     Depression     Hyperlipidemia     Hypertension     Pneumonia     Renal disorder     Restless leg syndrome     Thyroid disease        Past Surgical History:   Procedure Laterality Date    CARPAL TUNNEL RELEASE      CHOLECYSTECTOMY      dementia      EGD " (ESOPHAGOGASTRODUODENOSCOPY) Left 4/17/2013    [facility] ENDO    EYE SURGERY      Cataract    HYSTERECTOMY      TONSILLECTOMY         Review of patient's allergies indicates:   Allergen Reactions    Qualaquin [quinine sulfate] Hives       No current facility-administered medications on file prior to encounter.      Current Outpatient Medications on File Prior to Encounter   Medication Sig    albuterol (PROAIR HFA) 90 mcg/actuation HFAA Inhale 2 puffs into the lungs every 6 (six) hours as needed.    cephALEXin (KEFLEX) 500 MG capsule Take 1 capsule (500 mg total) by mouth every 12 (twelve) hours. for 10 days    difluprednate (DUREZOL) 0.05 % Drop ophthalmic solution 1 drop. 1 drop in Left eye evening    DULoxetine (CYMBALTA) 20 MG capsule Take 20 mg by mouth once daily.    ferrous sulfate 325 (65 FE) MG EC tablet Take 325 mg by mouth 3 (three) times a week. Mon, Wed, Fri    latanoprost, PF, 0.005 % Drop Apply to eye every evening. 1 drop in right eye at night    levothyroxine (SYNTHROID) 50 MCG tablet Take 50 mcg by mouth once daily.    loratadine (CLARITIN) 10 mg tablet Take 10 mg by mouth once daily.    losartan (COZAAR) 100 MG tablet Take 100 mg by mouth once daily.    magnesium oxide (MAG-OX) 400 mg tablet Take 1 tablet (400 mg total) by mouth 2 (two) times daily. (Patient taking differently: Take 400 mg by mouth once daily. )    metoprolol succinate (TOPROL-XL) 100 MG 24 hr tablet Take 100 mg by mouth once daily.    potassium chloride SA (K-DUR,KLOR-CON) 10 MEQ tablet Take 10 mEq by mouth once daily.     ranitidine (ZANTAC) 150 MG tablet Take 150 mg by mouth once daily.    risperiDONE (RISPERDAL) 0.25 MG Tab Take by mouth every evening.    rOPINIRole (REQUIP) 1 MG tablet Take 1 mg by mouth 2 (two) times daily.    pantoprazole (PROTONIX) 40 MG tablet Take 1 tablet (40 mg total) by mouth 2 (two) times daily.     Family History     None        Tobacco Use     "Smoking status: Never Smoker    Smokeless tobacco: Never Used   Substance and Sexual Activity    Alcohol use: No    Drug use: No    Sexual activity: Not on file     Review of Systems   Unable to perform ROS: Dementia     Objective:     Vital Signs (Most Recent):  Temp: 97.8 °F (36.6 °C) (01/20/19 1315)  Pulse: 76 (01/20/19 1342)  Resp: 16 (01/20/19 1342)  BP: (!) 169/74 (01/20/19 1342)  SpO2: 97 % (01/20/19 1342) Vital Signs (24h Range):  Temp:  [97.5 °F (36.4 °C)-98.5 °F (36.9 °C)] 97.8 °F (36.6 °C)  Pulse:  [66-94] 76  Resp:  [14-20] 16  SpO2:  [97 %-100 %] 97 %  BP: (121-169)/(58-74) 169/74     Weight: 59.9 kg (132 lb)  Body mass index is 25.78 kg/m².    Physical Exam   Constitutional: She appears well-developed and well-nourished. No distress.   HENT:   Head: Normocephalic and atraumatic.   Eyes: EOM are normal. Right eye exhibits no discharge. Left eye exhibits no discharge.   Neck: Normal range of motion.   Cardiovascular: Normal rate, regular rhythm and intact distal pulses.   Pulmonary/Chest: Effort normal and breath sounds normal.   Abdominal: Soft. Bowel sounds are normal. She exhibits no distension. There is no tenderness. There is no guarding.   Musculoskeletal: She exhibits tenderness.   Neurological: She is alert.   Oriented to first name (baseline)   Skin: Skin is warm. She is not diaphoretic. There is erythema.   Psychiatric: She has a normal mood and affect. Her behavior is normal.         CRANIAL NERVES     CN III, IV, VI   Extraocular motions are normal.        Significant Labs: All pertinent labs within the past 24 hours have been reviewed.    Significant Imaging: I have reviewed all pertinent imaging results/findings within the past 24 hours.    Assessment/Plan:     * Syncope    Dementia  Witnessed episode of emesis followed by syncope; daughter reports patient "was out for less than a minute"  - suspect emesis 2/2 new abx administration for L wrist swelling; likely vasovagal sycnope " "following emesis  - trop neg x 2  - UA and CXR unremarkable  - TTE ordered  - PTOT consulted; daughter is open to NH placement- will discuss with CM tomorrow     Wrist pain, acute, left    Pt seen at [facility] yesterday 1/19 per chart review: wrist imaging unremarkable, "suspect her pain is likely due to some sort of trauma, though infection is possibility"  - neurovascularly intact on exam; L wrist in ACE wrap  - afebrile without leukocytosis here  - continue keflex with PPI and Zofran  - PT/OT     Type 2 diabetes mellitus    Family reports diet controlled  A1c in AM  Low dose sliding scale       Cardiac rhythm disorder or disturbance or change    Pt daughter reports a-fib ~4 yrs ago; well controlled  - continue toprol 100mg qhs  - tele  EKG NSR     Hypokalemia    Hypomagnesemia  K+ 4.2; will check Mg in AM labs  - cont home supplements     Cataracts, bilateral    Continue home drops     Hypothyroid    Continue home synthroid  TSH, T4 in AM       VTE Risk Mitigation (From admission, onward)    None             Meli Yuan PA-C  Department of Hospital Medicine   [facility]  "

## 2019-01-20 NOTE — SUBJECTIVE & OBJECTIVE
Past Medical History:   Diagnosis Date    Asthma     Depression     Hyperlipidemia     Hypertension     Pneumonia     Renal disorder     Restless leg syndrome     Thyroid disease        Past Surgical History:   Procedure Laterality Date    CARPAL TUNNEL RELEASE      CHOLECYSTECTOMY      dementia      EGD (ESOPHAGOGASTRODUODENOSCOPY) Left 4/17/2013    Performed by Pillo Chin MD at [facility] ENDO    EYE SURGERY      Cataract    HYSTERECTOMY      TONSILLECTOMY         Review of patient's allergies indicates:   Allergen Reactions    Qualaquin [quinine sulfate] Hives       No current facility-administered medications on file prior to encounter.      Current Outpatient Medications on File Prior to Encounter   Medication Sig    albuterol (PROAIR HFA) 90 mcg/actuation HFAA Inhale 2 puffs into the lungs every 6 (six) hours as needed.    cephALEXin (KEFLEX) 500 MG capsule Take 1 capsule (500 mg total) by mouth every 12 (twelve) hours. for 10 days    difluprednate (DUREZOL) 0.05 % Drop ophthalmic solution 1 drop. 1 drop in Left eye evening    DULoxetine (CYMBALTA) 20 MG capsule Take 20 mg by mouth once daily.    ferrous sulfate 325 (65 FE) MG EC tablet Take 325 mg by mouth 3 (three) times a week. Mon, Wed, Fri    latanoprost, PF, 0.005 % Drop Apply to eye every evening. 1 drop in right eye at night    levothyroxine (SYNTHROID) 50 MCG tablet Take 50 mcg by mouth once daily.    loratadine (CLARITIN) 10 mg tablet Take 10 mg by mouth once daily.    losartan (COZAAR) 100 MG tablet Take 100 mg by mouth once daily.    magnesium oxide (MAG-OX) 400 mg tablet Take 1 tablet (400 mg total) by mouth 2 (two) times daily. (Patient taking differently: Take 400 mg by mouth once daily. )    metoprolol succinate (TOPROL-XL) 100 MG 24 hr tablet Take 100 mg by mouth once daily.    potassium chloride SA (K-DUR,KLOR-CON) 10 MEQ tablet Take 10 mEq by mouth once daily.     ranitidine (ZANTAC) 150 MG tablet Take 150 mg by  mouth once daily.    risperiDONE (RISPERDAL) 0.25 MG Tab Take by mouth every evening.    rOPINIRole (REQUIP) 1 MG tablet Take 1 mg by mouth 2 (two) times daily.    pantoprazole (PROTONIX) 40 MG tablet Take 1 tablet (40 mg total) by mouth 2 (two) times daily.     Family History     None        Tobacco Use    Smoking status: Never Smoker    Smokeless tobacco: Never Used   Substance and Sexual Activity    Alcohol use: No    Drug use: No    Sexual activity: Not on file     Review of Systems   Unable to perform ROS: Dementia     Objective:     Vital Signs (Most Recent):  Temp: 97.8 °F (36.6 °C) (01/20/19 1315)  Pulse: 76 (01/20/19 1342)  Resp: 16 (01/20/19 1342)  BP: (!) 169/74 (01/20/19 1342)  SpO2: 97 % (01/20/19 1342) Vital Signs (24h Range):  Temp:  [97.5 °F (36.4 °C)-98.5 °F (36.9 °C)] 97.8 °F (36.6 °C)  Pulse:  [66-94] 76  Resp:  [14-20] 16  SpO2:  [97 %-100 %] 97 %  BP: (121-169)/(58-74) 169/74     Weight: 59.9 kg (132 lb)  Body mass index is 25.78 kg/m².    Physical Exam   Constitutional: She appears well-developed and well-nourished. No distress.   HENT:   Head: Normocephalic and atraumatic.   Eyes: EOM are normal. Right eye exhibits no discharge. Left eye exhibits no discharge.   Neck: Normal range of motion.   Cardiovascular: Normal rate, regular rhythm and intact distal pulses.   Pulmonary/Chest: Effort normal and breath sounds normal.   Abdominal: Soft. Bowel sounds are normal. She exhibits no distension. There is no tenderness. There is no guarding.   Musculoskeletal: She exhibits tenderness.   Neurological: She is alert.   Oriented to first name (baseline)   Skin: Skin is warm. She is not diaphoretic. There is erythema.   Psychiatric: She has a normal mood and affect. Her behavior is normal.         CRANIAL NERVES     CN III, IV, VI   Extraocular motions are normal.        Significant Labs: All pertinent labs within the past 24 hours have been reviewed.    Significant Imaging: I have reviewed all  pertinent imaging results/findings within the past 24 hours.

## 2019-01-20 NOTE — ASSESSMENT & PLAN NOTE
Pt daughter reports a-fib ~4 yrs ago; well controlled  - continue toprol 100mg qhs  - tele  EKG NSR

## 2019-01-20 NOTE — HPI
"Gardenia Ferrer is a 89F with dementia, asthma, DM2, HTN, a-fib (daughter reports one episode 4 years ago) who presents to ED for witnessed syncope. Daughter at bedside provides history. She states that yesterday the patient was brought to [facility] ED for left wrist pain associated with swelling/erythema. Wrist imaging was unremarkable and the patient was discharged with Keflex. This AM the daughter gave the pt her Keflex and shortly after heard her coughing in her room. After the coughing fit, the patient vomited and then went "unresponsive" for less than a minute. Daughter states that the patient did not endorse any complaints prior to the syncopal episode. Patient has no complaints at time of my exam. No fevers at home per daughter.    Of note, in ED caregiver is tearful when discussing her abilities to take care of the patient. At this time she is interested in placement for the patient.     VSS, afebrile without leukocytosis. EKG no new ischemic changes, trop neg x 2, BNP WNL. UA unremarkable. CXR unremarkable.  "

## 2019-01-20 NOTE — ASSESSMENT & PLAN NOTE
"Pt seen at [facility] [place] operated by [facility] yesterday 1/19 per chart review: wrist imaging unremarkable, "suspect her pain is likely due to some sort of trauma, though infection is possibility"  - neurovascularly intact on exam; L wrist in ACE wrap  - afebrile without leukocytosis here  - continue keflex with PPI and Zofran  - PT/OT  "

## 2019-01-20 NOTE — ED NOTES
Tele box [id number] applied to pt. Zach in war room states able to see pt on monitor, rhythm NSR with HR 68.

## 2019-01-20 NOTE — PLAN OF CARE
Problem: Adult Inpatient Plan of Care  Goal: Plan of Care Review  Outcome: Ongoing (interventions implemented as appropriate)  Pt in bed resting at this time. Oriented to self only. Alert to voice and able to follow commands, however shows spurts of confusion. Was accompanied by daughter upon arrival. VSS as last charted in f/s. Tolerated medications well, diet in place as tolerated per PA. PIV c/d/i flushed/capped. Echo came to bedside for scheduled TTE. Pt tolerated well. No acute changes noted at this time. Will prepare report to oncoming shift nurse.

## 2019-01-20 NOTE — ED TRIAGE NOTES
Left ribs scatter bruising, left wrist tenderness and swelling, redness    Patient identifiers verified and correct for Gardenia Ferrer.    LOC: The patient is awake, alert and oriented x 4. Pt is speaking appropriately, no slurred speech.  APPEARANCE: Patient resting comfortably and in no acute distress. Pt is clean and well groomed. No JVD visible. Pt reports pain level of 5/10.  SKIN: Skin is warm dry and multiple bruises to left ribs and abdomen and right upper arm, and color is consistent with ethnicity. No tenting observed and capillary refill <3 seconds. No clubbing noted to nail beds. No breakdown. mucus membranes moist and acyanotic.  MUSCULOSKELETAL: left wrist restrictive movent due to pain swelling and redness  RESPIRATORY: Airway is open and patent. Respirations-unlabored, regular rate, equal bilaterally on inspiration and expiration. No accessory muscle use noted. Lungs clear to auscultation in all fields bilaterally anterior and posterior.   CARDIAC: Patient has regular heart rate and rhythm.  No peripheral edema noted, and patient has no c/o chest pain.  ABDOMEN: Soft and non-tender to palpation with no distention noted. Normoactive bowel sounds X4 quadrants. Pt has no complaints of abnormal bowel movements. Pt reports normal appetite.   NEUROLOGIC: Eyes open spontaneously and facial expression symmetrical. Pt behavior appropriate to situation, and pt follows commands.  Pt reports sensation present in all extremities when touched with a finger. No s/s of ischemic stroke. PERRLA  : No complaints of frequency, burning, urgency or blood in the urine.

## 2019-01-20 NOTE — ED PROVIDER NOTES
Encounter Date: 1/20/2019    SCRIBE #1 NOTE: I, Chao Perera, am scribing for, and in the presence of,  Dr. Ricardo. I have scribed the following portions of the note - the EKG reading.       History     Chief Complaint   Patient presents with    Nausea     N/V after taking Keflex for Osteomyelitis.      89-year-old female with HTN, thyroid disease, HLD, renal disorder, recent diagnosis of dementia with behavioral disturbance presents to the ED with her daughter for evaluation of a syncopal episode with nausea and vomiting. Daughter is the patient's primary caregiver and lives in patient's home. Patient's daughter reports that she heard the patient coughing in her room and found that the patient had vomited in her bed. Daughter also mentions that the patient was holding her lower abdomen and complaining of pain.  She then witnessed a syncopal episode while the patient was on her bed.  Patient was unconscious for less than 1 min.  Daughter reports that the patient was seen in the ED yesterday for wrist pain and swelling and placed on Keflex for a suspected wrist infection (no diagnosis of osteomyelitis was mentioned in ED note).  Daughter states that the antibiotic was delivered last night and the patient was given a dose at that time.  History from patient is limited secondary to dementia.  She has no longer complaining of nausea or vomiting.  She denies abdominal pain, chest pain, shortness of breath. Daughter denies any fevers.  Daughter reports no cardiac history, but reports that the patient had a run of AFib about 4 years ago.  Not currently anticoagulated.          Review of patient's allergies indicates:   Allergen Reactions    Qualaquin [quinine sulfate] Hives     Past Medical History:   Diagnosis Date    Asthma     Depression     Hyperlipidemia     Hypertension     Pneumonia     Renal disorder     Restless leg syndrome     Thyroid disease      Past Surgical History:   Procedure Laterality  Date    CARPAL TUNNEL RELEASE      CHOLECYSTECTOMY      dementia      EGD (ESOPHAGOGASTRODUODENOSCOPY) Left 4/17/2013    [facility] by [facility] at [facility] ENDO    EYE SURGERY      Cataract    HYSTERECTOMY      TONSILLECTOMY       History reviewed. No pertinent family history.  Social History     Tobacco Use    Smoking status: Never Smoker    Smokeless tobacco: Never Used   Substance Use Topics    Alcohol use: No    Drug use: No     Review of Systems   Unable to perform ROS: Dementia   Constitutional: Negative for fever.   Respiratory: Negative for shortness of breath.    Cardiovascular: Negative for chest pain.   Gastrointestinal: Positive for abdominal pain (resolved), nausea (resolved) and vomiting.   Neurological: Positive for syncope.       Physical Exam     Initial Vitals [01/20/19 0757]   BP Pulse Resp Temp SpO2   (!) 150/70 82 16 97.6 °F (36.4 °C) 97 %      MAP       --         Physical Exam    Nursing note and vitals reviewed.  Constitutional: She appears well-developed and well-nourished. She is not diaphoretic.  Non-toxic appearance. She does not appear ill. No distress.   HENT:   Head: Normocephalic and atraumatic.   Neck: Neck supple.   Cardiovascular: Normal rate and regular rhythm. Exam reveals no gallop and no friction rub.    No murmur heard.  Pulmonary/Chest: Effort normal and breath sounds normal. No accessory muscle usage. No tachypnea. No respiratory distress. She has no decreased breath sounds. She has no wheezes. She has no rhonchi. She has no rales.   Abdominal: Soft. She exhibits no distension. There is no tenderness.   Musculoskeletal:   Tenderness, mild swelling, mild erythema noted to the dorsal left wrist .   Neurological: She is alert.   No facial droop or extremity drift.   Skin: No rash noted.   Psychiatric: She has a normal mood and affect. Her behavior is normal.         ED Course   Procedures  Labs Reviewed   CBC W/ AUTO DIFFERENTIAL - Abnormal; Notable for the  following components:       Result Value    Hemoglobin 11.9 (*)     MCHC 31.2 (*)     Immature Granulocytes 0.8 (*)     Gran # (ANC) 8.4 (*)     Immature Grans (Abs) 0.08 (*)     Lymph # 0.4 (*)     Gran% 86.3 (*)     Lymph% 4.1 (*)     All other components within normal limits   COMPREHENSIVE METABOLIC PANEL - Abnormal; Notable for the following components:    Glucose 161 (*)     BUN, Bld 26 (*)     Calcium 10.6 (*)     Albumin 3.1 (*)     Alkaline Phosphatase 165 (*)     All other components within normal limits   URINALYSIS, REFLEX TO URINE CULTURE - Abnormal; Notable for the following components:    Ketones, UA Trace (*)     All other components within normal limits    Narrative:     Preferred Collection Type->Urine, Catheterized   TROPONIN I   B-TYPE NATRIURETIC PEPTIDE   TROPONIN I     EKG Readings: (Independently Interpreted)   Rhythm: Normal Sinus Rhythm. Heart Rate: 74.   Non-specific ST changes in lateral leads. No elevations or depressions. Unchanged when compared to January 12, 2019.       Imaging Results          X-Ray Chest AP Portable (Final result)  Result time 01/20/19 09:17:31    Final result by Wilmar Dhillon MD (01/20/19 09:17:31)                 Impression:      See above      Electronically signed by: Wilmar Dhillon MD  Date:    01/20/2019  Time:    09:17             Narrative:    EXAMINATION:  XR CHEST AP PORTABLE    CLINICAL HISTORY:  Vomiting, unspecified    TECHNIQUE:  Single frontal view of the chest was performed.    COMPARISON:  None    FINDINGS:  Mild cardiomegaly.  The lungs are clear.  No pleural effusion                                 Medical Decision Making:   History:   Old Medical Records: I decided to obtain old medical records.  Differential Diagnosis:   My differential diagnosis includes but is not limited to:  Vasovagal syncope, ACS, cardiac dysrhythmia, dehydration, anemia, electrolyte disturbance  Independently Interpreted Test(s):   I have ordered and independently  interpreted EKG Reading(s) - see prior notes  Clinical Tests:   Lab Tests: Reviewed and Ordered  Radiological Study: Ordered and Reviewed  Medical Tests: Reviewed and Ordered       APC / Resident Notes:   89-year-old female presents with syncope after an episode of vomiting this morning.  BP slightly elevated with systolic in 150 to 160s the.  Afebrile, no acute distress. Nonfocal neuro exam.  Abdomen soft and nontender. There is evidence of mild cellulitis noted to the dorsal left wrist.  Patient was started on Keflex for this yesterday.     WBC count 9.68 with a left shift.  Baseline typically around 6.  No significant anemia.  Mild hyperglycemia at 161.  Troponins x2 are within normal limits. EKG reveals no ischemic patterns or other life-threatening arrhythmia.  Chest x-ray without acute abnormalities.    Will place patient in observation for monitoring given her age and syncopal episode. I have reviewed the patient's records and discussed this case with my supervising physician.             Scribe Attestation:   Scribe #1: I performed the above scribed service and the documentation accurately describes the services I performed. I attest to the accuracy of the note.               Clinical Impression:   The primary encounter diagnosis was Syncope, unspecified syncope type. Diagnoses of Vomiting and Syncope were also pertinent to this visit.      Disposition:   Disposition: Placed in Observation  Condition: Mason Myers PA-C  01/20/19 0653

## 2019-01-20 NOTE — ASSESSMENT & PLAN NOTE
"Dementia  Witnessed episode of emesis followed by syncope; daughter reports patient "was out for less than a minute"  - suspect emesis 2/2 new abx administration for L wrist swelling; likely vasovagal sycnope following emesis  - trop neg x 2  - UA and CXR unremarkable  - TTE ordered  - PTOT consulted; daughter is open to NH placement- will discuss with CM tomorrow  "

## 2019-01-21 LAB
ANION GAP SERPL CALC-SCNC: 7 MMOL/L
BASOPHILS # BLD AUTO: 0.06 K/UL
BASOPHILS NFR BLD: 1 %
BUN SERPL-MCNC: 26 MG/DL
CALCIUM SERPL-MCNC: 10 MG/DL
CHLORIDE SERPL-SCNC: 102 MMOL/L
CO2 SERPL-SCNC: 26 MMOL/L
CREAT SERPL-MCNC: 0.8 MG/DL
DIFFERENTIAL METHOD: ABNORMAL
EOSINOPHIL # BLD AUTO: 0.5 K/UL
EOSINOPHIL NFR BLD: 8 %
ERYTHROCYTE [DISTWIDTH] IN BLOOD BY AUTOMATED COUNT: 12.9 %
EST. GFR  (AFRICAN AMERICAN): >60 ML/MIN/1.73 M^2
EST. GFR  (NON AFRICAN AMERICAN): >60 ML/MIN/1.73 M^2
ESTIMATED AVG GLUCOSE: 126 MG/DL
GLUCOSE SERPL-MCNC: 100 MG/DL
HBA1C MFR BLD HPLC: 6 %
HCT VFR BLD AUTO: 33.9 %
HGB BLD-MCNC: 10.6 G/DL
IMM GRANULOCYTES # BLD AUTO: 0.04 K/UL
IMM GRANULOCYTES NFR BLD AUTO: 0.7 %
LYMPHOCYTES # BLD AUTO: 1.2 K/UL
LYMPHOCYTES NFR BLD: 20.1 %
MAGNESIUM SERPL-MCNC: 1.5 MG/DL
MCH RBC QN AUTO: 29.9 PG
MCHC RBC AUTO-ENTMCNC: 31.3 G/DL
MCV RBC AUTO: 96 FL
MONOCYTES # BLD AUTO: 0.8 K/UL
MONOCYTES NFR BLD: 14.2 %
NEUTROPHILS # BLD AUTO: 3.2 K/UL
NEUTROPHILS NFR BLD: 56 %
NRBC BLD-RTO: 0 /100 WBC
PHOSPHATE SERPL-MCNC: 3.2 MG/DL
PLATELET # BLD AUTO: 255 K/UL
PMV BLD AUTO: 10.4 FL
POCT GLUCOSE: 101 MG/DL (ref 70–110)
POCT GLUCOSE: 113 MG/DL (ref 70–110)
POCT GLUCOSE: 116 MG/DL (ref 70–110)
POCT GLUCOSE: 119 MG/DL (ref 70–110)
POCT GLUCOSE: 93 MG/DL (ref 70–110)
POTASSIUM SERPL-SCNC: 4.3 MMOL/L
RBC # BLD AUTO: 3.54 M/UL
SODIUM SERPL-SCNC: 135 MMOL/L
T4 FREE SERPL-MCNC: 1.42 NG/DL
TSH SERPL DL<=0.005 MIU/L-ACNC: 2.33 UIU/ML
WBC # BLD AUTO: 5.78 K/UL

## 2019-01-21 PROCEDURE — 86580 TB INTRADERMAL TEST: CPT | Performed by: HOSPITALIST

## 2019-01-21 PROCEDURE — 85025 COMPLETE CBC W/AUTO DIFF WBC: CPT

## 2019-01-21 PROCEDURE — A4216 STERILE WATER/SALINE, 10 ML: HCPCS | Performed by: PHYSICIAN ASSISTANT

## 2019-01-21 PROCEDURE — 84443 ASSAY THYROID STIM HORMONE: CPT

## 2019-01-21 PROCEDURE — 63600175 PHARM REV CODE 636 W HCPCS: Performed by: HOSPITALIST

## 2019-01-21 PROCEDURE — 84439 ASSAY OF FREE THYROXINE: CPT

## 2019-01-21 PROCEDURE — 63600175 PHARM REV CODE 636 W HCPCS: Performed by: PHYSICIAN ASSISTANT

## 2019-01-21 PROCEDURE — 99225 PR SUBSEQUENT OBSERVATION CARE,LEVEL II: CPT | Mod: ,,, | Performed by: PHYSICIAN ASSISTANT

## 2019-01-21 PROCEDURE — 83735 ASSAY OF MAGNESIUM: CPT

## 2019-01-21 PROCEDURE — 36415 COLL VENOUS BLD VENIPUNCTURE: CPT

## 2019-01-21 PROCEDURE — G0378 HOSPITAL OBSERVATION PER HR: HCPCS

## 2019-01-21 PROCEDURE — 84100 ASSAY OF PHOSPHORUS: CPT

## 2019-01-21 PROCEDURE — 99225 PR SUBSEQUENT OBSERVATION CARE,LEVEL II: ICD-10-PCS | Mod: ,,, | Performed by: PHYSICIAN ASSISTANT

## 2019-01-21 PROCEDURE — 25000003 PHARM REV CODE 250: Performed by: PHYSICIAN ASSISTANT

## 2019-01-21 PROCEDURE — 83036 HEMOGLOBIN GLYCOSYLATED A1C: CPT

## 2019-01-21 PROCEDURE — 80048 BASIC METABOLIC PNL TOTAL CA: CPT

## 2019-01-21 RX ADMIN — DULOXETINE HYDROCHLORIDE 20 MG: 20 CAPSULE, DELAYED RELEASE ORAL at 09:01

## 2019-01-21 RX ADMIN — HEPARIN SODIUM 5000 UNITS: 5000 INJECTION, SOLUTION INTRAVENOUS; SUBCUTANEOUS at 03:01

## 2019-01-21 RX ADMIN — PANTOPRAZOLE SODIUM 40 MG: 40 TABLET, DELAYED RELEASE ORAL at 09:01

## 2019-01-21 RX ADMIN — HEPARIN SODIUM 5000 UNITS: 5000 INJECTION, SOLUTION INTRAVENOUS; SUBCUTANEOUS at 09:01

## 2019-01-21 RX ADMIN — Medication 5 ML: at 05:01

## 2019-01-21 RX ADMIN — Medication 5 UNITS: at 06:01

## 2019-01-21 RX ADMIN — CEPHALEXIN 500 MG: 500 CAPSULE ORAL at 09:01

## 2019-01-21 RX ADMIN — FERROUS SULFATE TAB EC 325 MG (65 MG FE EQUIVALENT) 325 MG: 325 (65 FE) TABLET DELAYED RESPONSE at 03:01

## 2019-01-21 RX ADMIN — ROPINIROLE HYDROCHLORIDE 1 MG: 0.25 TABLET, FILM COATED ORAL at 09:01

## 2019-01-21 RX ADMIN — LOSARTAN POTASSIUM 100 MG: 50 TABLET, FILM COATED ORAL at 09:01

## 2019-01-21 RX ADMIN — LEVOTHYROXINE SODIUM 50 MCG: 50 TABLET ORAL at 09:01

## 2019-01-21 RX ADMIN — POTASSIUM CHLORIDE 10 MEQ: 750 CAPSULE, EXTENDED RELEASE ORAL at 09:01

## 2019-01-21 RX ADMIN — MAGNESIUM OXIDE TAB 400 MG (241.3 MG ELEMENTAL MG) 400 MG: 400 (241.3 MG) TAB at 09:01

## 2019-01-21 RX ADMIN — METOPROLOL SUCCINATE 100 MG: 50 TABLET, EXTENDED RELEASE ORAL at 09:01

## 2019-01-21 RX ADMIN — HEPARIN SODIUM 5000 UNITS: 5000 INJECTION, SOLUTION INTRAVENOUS; SUBCUTANEOUS at 05:01

## 2019-01-21 NOTE — ASSESSMENT & PLAN NOTE
Hypomagnesemia  K+ 4.2; will check Mg in AM labs  - cont home supplements  1/21: Mg 1.5, continue home supplements for now

## 2019-01-21 NOTE — SUBJECTIVE & OBJECTIVE
Interval History: No acute events overnight. No further episodes of vomiting since admission. This AM patient is confused about where she is and is asking for her mother to come in. Later in the day, reassessed patient with daughter present at bedside. Patient's mentation stable from AM exam.    Review of Systems   Unable to perform ROS: Dementia     Objective:     Vital Signs (Most Recent):  Temp: 98.5 °F (36.9 °C) (01/21/19 1547)  Pulse: 74 (01/21/19 1551)  Resp: 18 (01/21/19 1547)  BP: 132/60 (01/21/19 1547)  SpO2: 95 % (01/21/19 1547) Vital Signs (24h Range):  Temp:  [97.8 °F (36.6 °C)-98.8 °F (37.1 °C)] 98.5 °F (36.9 °C)  Pulse:  [] 74  Resp:  [16-20] 18  SpO2:  [95 %-98 %] 95 %  BP: (124-148)/(59-84) 132/60     Weight: 59.9 kg (132 lb)  Body mass index is 25.78 kg/m².    Intake/Output Summary (Last 24 hours) at 1/21/2019 1707  Last data filed at 1/21/2019 1245  Gross per 24 hour   Intake 660 ml   Output --   Net 660 ml      Physical Exam   Constitutional: No distress.   HENT:   Head: Normocephalic.   Eyes: EOM are normal. Right eye exhibits no discharge. Left eye exhibits no discharge.   Neck: Normal range of motion.   Cardiovascular: Normal rate and regular rhythm.   Pulmonary/Chest: Effort normal. No respiratory distress.   Abdominal: Soft. Bowel sounds are normal. She exhibits no distension. There is no tenderness.   Musculoskeletal: She exhibits tenderness (L hand).   Neurological: She is alert.   Oriented to first name (baseline)   Skin: Skin is warm. She is not diaphoretic. There is erythema.   Psychiatric: She has a normal mood and affect. Her behavior is normal.       Significant Labs: All pertinent labs within the past 24 hours have been reviewed.    Significant Imaging: I have reviewed all pertinent imaging results/findings within the past 24 hours.

## 2019-01-21 NOTE — PLAN OF CARE
Problem: Adult Inpatient Plan of Care  Goal: Plan of Care Review  Outcome: Ongoing (interventions implemented as appropriate)  Plan of care continued per orders. Telemetry in progress. Pt turned every 2 hours with BLE elevated. Reassurance given. Fall precautions maintained. Call light in reach. Pt reoriented as tolerated. No complains of nausea after taking antibiotic.Comfort and safety maintained

## 2019-01-21 NOTE — PROGRESS NOTES
"[facility] Hospital Medicine  Progress Note    Patient Name: Gardenia Ferrer  MRN: [id number]  Patient Class: OP- Observation   Admission Date: 1/20/2019  Length of Stay: 0 days  Attending Physician: Ching Argueta MD  Primary Care Provider: [facility] Medicine Team: [facility] HOSP MED E Meli Yuan PA-C    Subjective:     Principal Problem:Syncope    HPI:  Gardenia Ferrer is a 89F with dementia, asthma, DM2, HTN, a-fib (daughter reports one episode 4 years ago) who presents to ED for witnessed syncope. Daughter at bedside provides history. She states that yesterday the patient was brought to [facility] ED for left wrist pain associated with swelling/erythema. Wrist imaging was unremarkable and the patient was discharged with Keflex. This AM the daughter gave the pt her Keflex and shortly after heard her coughing in her room. After the coughing fit, the patient vomited and then went "unresponsive" for less than a minute. Daughter states that the patient did not endorse any complaints prior to the syncopal episode. Patient has no complaints at time of my exam. No fevers at home per daughter.    Of note, in ED caregiver is tearful when discussing her abilities to take care of the patient. At this time she is interested in placement for the patient.     VSS, afebrile without leukocytosis. EKG no new ischemic changes, trop neg x 2, BNP WNL. UA unremarkable. CXR unremarkable.    Hospital Course:  Patient admitted for witnessed syncope. EKG no new ischemic changes and troponin WNL. TTE shows mod diastolic dysfunction with pEF 65%. Patient's daughter is sole caregiver and reports feeling overwhelmed at home with her mother's medical needs. She is agreeable to NH placement.     PT OT consulted. Medically stable, CM/SW aware of placement needs.    Interval History: No acute events overnight. No further episodes of vomiting since admission. This AM patient is confused " "about where she is and is asking for her mother to come in. Later in the day, reassessed patient with daughter present at bedside. Patient's mentation stable from AM exam.    Review of Systems   Unable to perform ROS: Dementia     Objective:     Vital Signs (Most Recent):  Temp: 98.5 °F (36.9 °C) (01/21/19 1547)  Pulse: 74 (01/21/19 1551)  Resp: 18 (01/21/19 1547)  BP: 132/60 (01/21/19 1547)  SpO2: 95 % (01/21/19 1547) Vital Signs (24h Range):  Temp:  [97.8 °F (36.6 °C)-98.8 °F (37.1 °C)] 98.5 °F (36.9 °C)  Pulse:  [] 74  Resp:  [16-20] 18  SpO2:  [95 %-98 %] 95 %  BP: (124-148)/(59-84) 132/60     Weight: 59.9 kg (132 lb)  Body mass index is 25.78 kg/m².    Intake/Output Summary (Last 24 hours) at 1/21/2019 1707  Last data filed at 1/21/2019 1245  Gross per 24 hour   Intake 660 ml   Output --   Net 660 ml      Physical Exam   Constitutional: No distress.   HENT:   Head: Normocephalic.   Eyes: EOM are normal. Right eye exhibits no discharge. Left eye exhibits no discharge.   Neck: Normal range of motion.   Cardiovascular: Normal rate and regular rhythm.   Pulmonary/Chest: Effort normal. No respiratory distress.   Abdominal: Soft. Bowel sounds are normal. She exhibits no distension. There is no tenderness.   Musculoskeletal: She exhibits tenderness (L hand).   Neurological: She is alert.   Oriented to first name (baseline)   Skin: Skin is warm. She is not diaphoretic. There is erythema.   Psychiatric: She has a normal mood and affect. Her behavior is normal.       Significant Labs: All pertinent labs within the past 24 hours have been reviewed.    Significant Imaging: I have reviewed all pertinent imaging results/findings within the past 24 hours.    Assessment/Plan:      * Syncope    Dementia  Witnessed episode of emesis followed by syncope; daughter reports patient "was out for less than a minute"  - suspect emesis 2/2 new abx administration for L wrist swelling; likely vasovagal sycnope following emesis  - " "trop neg x 2  - UA and CXR unremarkable  - TTE shows mild diastolic dysfx with pEF 65%  - PTOT consulted; daughter is open to NH placement- will discuss with CM tomorrow     Wrist pain, acute, left    Pt seen at [facility] yesterday 1/19 per chart review: wrist imaging unremarkable, "suspect her pain is likely due to some sort of trauma, though infection is possibility"  - neurovascularly intact on exam; L wrist in ACE wrap  - afebrile without leukocytosis here  - continue keflex with PPI and Zofran  - PT/OT     Type 2 diabetes mellitus    Family reports diet controlled  A1c 6  Low dose sliding scale  1/21: BG stable, cont monitor       Cardiac rhythm disorder or disturbance or change    Pt daughter reports a-fib ~4 yrs ago; well controlled  - continue toprol 100mg qhs  - tele  EKG NSR     Hypokalemia    Hypomagnesemia  K+ 4.2; will check Mg in AM labs  - cont home supplements  1/21: Mg 1.5, continue home supplements for now     Cataracts, bilateral    Continue home drops     Hypothyroid    Continue home synthroid  TSH, T4 WNL       VTE Risk Mitigation (From admission, onward)        Ordered     heparin (porcine) injection 5,000 Units  Every 8 hours      01/20/19 1452     IP VTE HIGH RISK PATIENT  Once      01/20/19 1452              Meli Yuan PA-C  Department of Hospital Medicine   [facility]  "

## 2019-01-21 NOTE — HOSPITAL COURSE
Patient admitted for witnessed syncope. EKG no new ischemic changes and troponin WNL. TTE shows mod diastolic dysfunction with pEF 65%. Patient's daughter is sole caregiver and reports feeling overwhelmed at home with her mother's medical needs. She is agreeable to NH placement. Erythema to left wrist improved and itching treated with hydrocortisone cream which also improved. Patient is participating in Pt/OT. Medically stable.

## 2019-01-21 NOTE — ASSESSMENT & PLAN NOTE
"Dementia  Witnessed episode of emesis followed by syncope; daughter reports patient "was out for less than a minute"  - suspect emesis 2/2 new abx administration for L wrist swelling; likely vasovagal sycnope following emesis  - trop neg x 2  - UA and CXR unremarkable  - TTE shows mild diastolic dysfx with pEF 65%  - PTOT consulted; daughter is open to NH placement- will discuss with CM tomorrow  "

## 2019-01-21 NOTE — PLAN OF CARE
Transfer from [facility] to [facility] (staff Dr Reid tomorrow)

## 2019-01-21 NOTE — PLAN OF CARE
Problem: Fall Injury Risk  Goal: Absence of Fall and Fall-Related Injury  Outcome: Ongoing (interventions implemented as appropriate)  No falls noted during the shift

## 2019-01-21 NOTE — NURSING
Pt moved from room 7 to 10 to be closer to the nurses station. Pt was scared and tearful upon assessment when in room 7. Pt states she likes room 10. Pt informed we are right outside her door so she is not alone and she can see people walking around which makes her happy. Plan for the night, orders and meds discussed. Pt is confused and difficult to orient. Pt forgets statements when attempting to reorient. Comfort and safety maintained. Call light in reach. Fall precautions maintained. Reassurance given.

## 2019-01-21 NOTE — PLAN OF CARE
01/21/19 1230   Discharge Assessment   Assessment Type Discharge Planning Assessment   Confirmed/corrected address and phone number on facesheet? Yes   Assessment information obtained from? Caregiver  (daughter, Ann Page ([phone]))   Expected Length of Stay (days) 3   Communicated expected length of stay with patient/caregiver yes   Prior to hospitilization cognitive status: Not Oriented to Time;Not Oriented to Place;Not Oriented to Person   Prior to hospitalization functional status: Wheelchair Bound   Current cognitive status: Not Oriented to Place;Not Oriented to Person   Current Functional Status: Wheelchair Bound   Lives With child(antony), adult  (adult daughter, Ann Page ([phone]))   Able to Return to Prior Arrangements no   Patient's perception of discharge disposition skilled nursing facility   Readmission Within the Last 30 Days no previous admission in last 30 days   Patient currently being followed by outpatient case management? No   Patient currently receives any other outside agency services? No   Equipment Currently Used at Home walker, standard;wheelchair;bedside commode;shower chair;grab bar;other (see comments)  (BP machine)   Do you have any problems affording any of your prescribed medications? No   Is the patient taking medications as prescribed? yes   Does the patient have transportation home? Yes   Transportation Anticipated health plan transportation   Does the patient receive [facility]? No   Discharge Plan A Skilled Nursing Facility   Discharge Plan B New Nursing Home placement - [facility] care facility   Patient/Family in Agreement with Plan yes     Dx: syncope  Consult: PT/OT  [facility]: [facility]  [facility] f[facility]u appt: [facility]    All discharge planning assessment information was obtained from the patient's daughter, Ann Page ([phone]), at the patient's bedside. Patient is currently receiving [facility] health care from [facility]. KENDRA Jamil ([phone]) w/[facility]  of patient's hospitalization. [facility] stated that the patient was discharged from their services on 12/28/19. Ann stated that she is unable to care for the patient at home & is interested in NH placement. Awaiting PT/OT recommendations for discharge needs. Patient recently enrolled in the [facility] program. Voicemail message left for YOLANDA Tamayo ([phone]) w/[facility] informing of the patient's hospitalization. CM informed YOLANDA Hall ([phone]) of above. CXR was done 1/20/19. PPD ordered. Will continue to follow.

## 2019-01-21 NOTE — PLAN OF CARE
Problem: Diabetes Comorbidity  Goal: Blood Glucose Level Within Desired Range  Outcome: Ongoing (interventions implemented as appropriate)  Blood glucose monitored before meals and at bedtime. Pt to be medicated with prn med if needed.

## 2019-01-21 NOTE — ASSESSMENT & PLAN NOTE
"Pt seen at [facility] yesterday 1/19 per chart review: wrist imaging unremarkable, "suspect her pain is likely due to some sort of trauma, though infection is possibility"  - neurovascularly intact on exam; L wrist in ACE wrap  - afebrile without leukocytosis here  - continue keflex with PPI and Zofran  - PT/OT  "

## 2019-01-22 PROBLEM — I10 ESSENTIAL HYPERTENSION: Status: ACTIVE | Noted: 2019-01-22

## 2019-01-22 LAB
ANION GAP SERPL CALC-SCNC: 4 MMOL/L
BASOPHILS # BLD AUTO: 0.06 K/UL
BASOPHILS NFR BLD: 1 %
BUN SERPL-MCNC: 19 MG/DL
CALCIUM SERPL-MCNC: 10.4 MG/DL
CHLORIDE SERPL-SCNC: 100 MMOL/L
CO2 SERPL-SCNC: 31 MMOL/L
CREAT SERPL-MCNC: 0.8 MG/DL
DIFFERENTIAL METHOD: ABNORMAL
EOSINOPHIL # BLD AUTO: 0.5 K/UL
EOSINOPHIL NFR BLD: 8.7 %
ERYTHROCYTE [DISTWIDTH] IN BLOOD BY AUTOMATED COUNT: 12.5 %
EST. GFR  (AFRICAN AMERICAN): >60 ML/MIN/1.73 M^2
EST. GFR  (NON AFRICAN AMERICAN): >60 ML/MIN/1.73 M^2
GLUCOSE SERPL-MCNC: 106 MG/DL
HCT VFR BLD AUTO: 35.5 %
HGB BLD-MCNC: 11.1 G/DL
IMM GRANULOCYTES # BLD AUTO: 0.03 K/UL
IMM GRANULOCYTES NFR BLD AUTO: 0.5 %
LYMPHOCYTES # BLD AUTO: 1.3 K/UL
LYMPHOCYTES NFR BLD: 22.8 %
MAGNESIUM SERPL-MCNC: 1.7 MG/DL
MCH RBC QN AUTO: 29.9 PG
MCHC RBC AUTO-ENTMCNC: 31.3 G/DL
MCV RBC AUTO: 96 FL
MONOCYTES # BLD AUTO: 0.9 K/UL
MONOCYTES NFR BLD: 15.3 %
NEUTROPHILS # BLD AUTO: 3.1 K/UL
NEUTROPHILS NFR BLD: 51.7 %
NRBC BLD-RTO: 0 /100 WBC
PHOSPHATE SERPL-MCNC: 2.7 MG/DL
PLATELET # BLD AUTO: 282 K/UL
PMV BLD AUTO: 10.1 FL
POCT GLUCOSE: 112 MG/DL (ref 70–110)
POCT GLUCOSE: 117 MG/DL (ref 70–110)
POCT GLUCOSE: 118 MG/DL (ref 70–110)
POCT GLUCOSE: 122 MG/DL (ref 70–110)
POTASSIUM SERPL-SCNC: 4.2 MMOL/L
RBC # BLD AUTO: 3.71 M/UL
SODIUM SERPL-SCNC: 135 MMOL/L
WBC # BLD AUTO: 5.89 K/UL

## 2019-01-22 PROCEDURE — 85025 COMPLETE CBC W/AUTO DIFF WBC: CPT

## 2019-01-22 PROCEDURE — 83735 ASSAY OF MAGNESIUM: CPT

## 2019-01-22 PROCEDURE — 99225 PR SUBSEQUENT OBSERVATION CARE,LEVEL II: CPT | Mod: ,,, | Performed by: NURSE PRACTITIONER

## 2019-01-22 PROCEDURE — 63600175 PHARM REV CODE 636 W HCPCS: Performed by: PHYSICIAN ASSISTANT

## 2019-01-22 PROCEDURE — G0378 HOSPITAL OBSERVATION PER HR: HCPCS

## 2019-01-22 PROCEDURE — G8979 MOBILITY GOAL STATUS: HCPCS | Mod: CK

## 2019-01-22 PROCEDURE — 97161 PT EVAL LOW COMPLEX 20 MIN: CPT

## 2019-01-22 PROCEDURE — 80048 BASIC METABOLIC PNL TOTAL CA: CPT

## 2019-01-22 PROCEDURE — 25000003 PHARM REV CODE 250: Performed by: NURSE PRACTITIONER

## 2019-01-22 PROCEDURE — 36415 COLL VENOUS BLD VENIPUNCTURE: CPT

## 2019-01-22 PROCEDURE — 25000003 PHARM REV CODE 250: Performed by: PHYSICIAN ASSISTANT

## 2019-01-22 PROCEDURE — G8978 MOBILITY CURRENT STATUS: HCPCS | Mod: CL

## 2019-01-22 PROCEDURE — 99225 PR SUBSEQUENT OBSERVATION CARE,LEVEL II: ICD-10-PCS | Mod: ,,, | Performed by: NURSE PRACTITIONER

## 2019-01-22 PROCEDURE — 84100 ASSAY OF PHOSPHORUS: CPT

## 2019-01-22 RX ORDER — AMLODIPINE BESYLATE 2.5 MG/1
2.5 TABLET ORAL DAILY
Status: DISCONTINUED | OUTPATIENT
Start: 2019-01-22 | End: 2019-01-25 | Stop reason: HOSPADM

## 2019-01-22 RX ORDER — HYDROXYZINE HYDROCHLORIDE 25 MG/1
25 TABLET, FILM COATED ORAL ONCE AS NEEDED
Status: COMPLETED | OUTPATIENT
Start: 2019-01-22 | End: 2019-01-24

## 2019-01-22 RX ORDER — MICONAZOLE NITRATE 2 %
POWDER (GRAM) TOPICAL 2 TIMES DAILY
Status: DISCONTINUED | OUTPATIENT
Start: 2019-01-22 | End: 2019-01-25 | Stop reason: HOSPADM

## 2019-01-22 RX ADMIN — PANTOPRAZOLE SODIUM 40 MG: 40 TABLET, DELAYED RELEASE ORAL at 10:01

## 2019-01-22 RX ADMIN — HEPARIN SODIUM 5000 UNITS: 5000 INJECTION, SOLUTION INTRAVENOUS; SUBCUTANEOUS at 03:01

## 2019-01-22 RX ADMIN — ROPINIROLE HYDROCHLORIDE 1 MG: 0.25 TABLET, FILM COATED ORAL at 10:01

## 2019-01-22 RX ADMIN — POLYETHYLENE GLYCOL 3350 17 G: 17 POWDER, FOR SOLUTION ORAL at 09:01

## 2019-01-22 RX ADMIN — HEPARIN SODIUM 5000 UNITS: 5000 INJECTION, SOLUTION INTRAVENOUS; SUBCUTANEOUS at 06:01

## 2019-01-22 RX ADMIN — CEPHALEXIN 500 MG: 500 CAPSULE ORAL at 10:01

## 2019-01-22 RX ADMIN — LEVOTHYROXINE SODIUM 50 MCG: 50 TABLET ORAL at 10:01

## 2019-01-22 RX ADMIN — METOPROLOL SUCCINATE 100 MG: 50 TABLET, EXTENDED RELEASE ORAL at 10:01

## 2019-01-22 RX ADMIN — DUREZOL 1 DROP: 0.5 EMULSION OPHTHALMIC at 10:01

## 2019-01-22 RX ADMIN — HEPARIN SODIUM 5000 UNITS: 5000 INJECTION, SOLUTION INTRAVENOUS; SUBCUTANEOUS at 10:01

## 2019-01-22 RX ADMIN — MAGNESIUM OXIDE TAB 400 MG (241.3 MG ELEMENTAL MG) 400 MG: 400 (241.3 MG) TAB at 09:01

## 2019-01-22 RX ADMIN — LOSARTAN POTASSIUM 100 MG: 50 TABLET, FILM COATED ORAL at 10:01

## 2019-01-22 RX ADMIN — RISPERIDONE 0.25 MG: 0.25 TABLET ORAL at 10:01

## 2019-01-22 RX ADMIN — POTASSIUM CHLORIDE 10 MEQ: 750 CAPSULE, EXTENDED RELEASE ORAL at 10:01

## 2019-01-22 RX ADMIN — DULOXETINE HYDROCHLORIDE 20 MG: 20 CAPSULE, DELAYED RELEASE ORAL at 10:01

## 2019-01-22 RX ADMIN — MICONAZOLE NITRATE: 20 POWDER TOPICAL at 10:01

## 2019-01-22 RX ADMIN — AMLODIPINE BESYLATE 2.5 MG: 2.5 TABLET ORAL at 03:01

## 2019-01-22 NOTE — PLAN OF CARE
Problem: Adult Inpatient Plan of Care  Goal: Plan of Care Review  Outcome: Ongoing (interventions implemented as appropriate)  Pt resting in bed, confused at times, reoriented to surroundings, slight rash noted to chest and thighs, call light in reach, side rails up x 2 for safety, will cont to monitor.

## 2019-01-22 NOTE — PLAN OF CARE
Problem: Adult Inpatient Plan of Care  Goal: Plan of Care Review  Outcome: Ongoing (interventions implemented as appropriate)  Pt with no falls or injuries this shift. Pt with no s/s hypo or hyperglycemia this shift. Pt afebrile, continues on PO antibiotics for UTI. Pt with no new skin breakdown.

## 2019-01-22 NOTE — PROGRESS NOTES
"[facility] Hospital Medicine  Progress Note    Patient Name: Gardenia Ferrer  MRN: [id number]  Patient Class: OP- Observation   Admission Date: 1/20/2019  Length of Stay: 0 days  Attending Physician: Kristin Reid MD  Primary Care Provider: Maximilian Crabtree [facility] Medicine Team: [facility] HOSP MED Z Olimpia Ramirez NP    Subjective:     Principal Problem:Syncope    HPI:  Gardenia Ferrer is a 89F with dementia, asthma, DM2, HTN, a-fib (daughter reports one episode 4 years ago) who presents to ED for witnessed syncope. Daughter at bedside provides history. She states that yesterday the patient was brought to [facility] ED for left wrist pain associated with swelling/erythema. Wrist imaging was unremarkable and the patient was discharged with Keflex. This AM the daughter gave the pt her Keflex and shortly after heard her coughing in her room. After the coughing fit, the patient vomited and then went "unresponsive" for less than a minute. Daughter states that the patient did not endorse any complaints prior to the syncopal episode. Patient has no complaints at time of my exam. No fevers at home per daughter.    Of note, in ED caregiver is tearful when discussing her abilities to take care of the patient. At this time she is interested in placement for the patient.     VSS, afebrile without leukocytosis. EKG no new ischemic changes, trop neg x 2, BNP WNL. UA unremarkable. CXR unremarkable.    Hospital Course:  Patient admitted for witnessed syncope. EKG no new ischemic changes and troponin WNL. TTE shows mod diastolic dysfunction with pEF 65%. Patient's daughter is sole caregiver and reports feeling overwhelmed at home with her mother's medical needs. She is agreeable to NH placement.     PT OT consulted. Medically stable, CM/SW aware of placement needs.    Interval History: Patient seen at bedside, doing well, reports rash to under breast, denies pain, here for placement. "     Review of Systems   Unable to perform ROS: Dementia (did c/o rash to breast)   Constitutional: Positive for activity change.     Scheduled Meds:   amLODIPine  2.5 mg Oral Daily    cephALEXin  500 mg Oral Q12H    difluprednate  1 drop Left Eye QHS    DULoxetine  20 mg Oral Daily    ferrous sulfate  325 mg Oral Once per day on Mon Wed Fri    heparin (porcine)  5,000 Units Subcutaneous Q8H    levothyroxine  50 mcg Oral Daily    losartan  100 mg Oral Daily    magnesium oxide  400 mg Oral Daily    metoprolol succinate  100 mg Oral QHS    miconazole NITRATE 2 %   Topical (Top) BID    pantoprazole  40 mg Oral BID    polyethylene glycol  17 g Oral Daily    potassium chloride  10 mEq Oral Daily    risperiDONE  0.25 mg Oral QHS    rOPINIRole  1 mg Oral BID     Continuous Infusions:  PRN Meds:.acetaminophen, albuterol, bisacodyl, dextrose 50%, dextrose 50%, glucagon (human recombinant), glucose, glucose, hydrOXYzine HCl, influenza, insulin aspart U-100, ondansetron, pneumoc 13-gerard conj-dip cr(PF), promethazine (PHENERGAN) IVPB, ramelteon, sodium chloride 0.9%    Objective:     Vital Signs (Most Recent):  Temp: 97.9 °F (36.6 °C) (01/22/19 1116)  Pulse: (!) 59 (01/22/19 1155)  Resp: 18 (01/22/19 1116)  BP: (!) 176/77 (01/22/19 1116)  SpO2: 100 % (01/22/19 1116) Vital Signs (24h Range):  Temp:  [96.5 °F (35.8 °C)-98.5 °F (36.9 °C)] 97.9 °F (36.6 °C)  Pulse:  [57-83] 59  Resp:  [18] 18  SpO2:  [95 %-100 %] 100 %  BP: (132-180)/(60-92) 176/77     Weight: 59.8 kg (131 lb 13.4 oz)  Body mass index is 25.75 kg/m².    Intake/Output Summary (Last 24 hours) at 1/22/2019 1339  Last data filed at 1/22/2019 0800  Gross per 24 hour   Intake 240 ml   Output --   Net 240 ml      Physical Exam   Constitutional: She appears well-developed and well-nourished. No distress.   Cardiovascular: Normal rate, regular rhythm and normal heart sounds. Exam reveals no gallop and no friction rub.   No murmur heard.  Pulmonary/Chest:  "Effort normal and breath sounds normal. No respiratory distress. She has no wheezes. She has no rales.   Abdominal: Soft. Bowel sounds are normal. She exhibits no distension. There is no tenderness.   Musculoskeletal: She exhibits no edema or tenderness.   Neurological: She is alert.   Oriented to self and situation    Skin: Skin is warm and dry. No rash noted. She is not diaphoretic. No cyanosis. Nails show no clubbing.   Erythema to left wrist, mild erythema under left breast   Psychiatric: She has a normal mood and affect. Her behavior is normal.       Significant Labs:   Recent Labs   Lab 01/20/19  0856 01/21/19  0505 01/22/19  0601   WBC 9.68 5.78 5.89   HGB 11.9* 10.6* 11.1*   HCT 38.1 33.9* 35.5*    255 282     Recent Labs   Lab 01/20/19  0856 01/21/19  0505 01/22/19  0601    135* 135*   K 4.2 4.3 4.2    102 100   CO2 24 26 31*   BUN 26* 26* 19   CREATININE 0.8 0.8 0.8   CALCIUM 10.6* 10.0 10.4   PROT 7.0  --   --    BILITOT 0.8  --   --    ALKPHOS 165*  --   --    ALT 11  --   --    AST 17  --   --      Lab Results   Component Value Date    LABPROT 12.8 (H) 04/17/2013    ALBUMIN 3.1 (L) 01/20/2019     Lab Results   Component Value Date    CALCIUM 10.4 01/22/2019    PHOS 2.7 01/22/2019     POCT Glucose   Date Value Ref Range Status   01/22/2019 117 (H) 70 - 110 mg/dL Final   01/22/2019 112 (H) 70 - 110 mg/dL Final   01/21/2019 93 70 - 110 mg/dL Final   01/21/2019 119 (H) 70 - 110 mg/dL Final   01/21/2019 113 (H) 70 - 110 mg/dL Final   01/21/2019 101 70 - 110 mg/dL Final   01/20/2019 116 (H) 70 - 110 mg/dL Final     Lab Results   Component Value Date    HGBA1C 6.0 (H) 01/21/2019       Significant Imaging: na    Assessment/Plan:      * Syncope    Dementia  · Witnessed episode of emesis followed by syncope; daughter reports patient "was out for less than a minute"  · suspect emesis 2/2 new abx administration for L wrist swelling; likely vasovagal sycnope following emesis  ·  trop neg x 2  · UA " "and CXR unremarkable  · TTE shows mild diastolic dysfx with pEF 65%  · PTOT consulted; daughter is open to NH placement- will discuss with CM tomorrow  · Delirium precautions:  · Avoid antihistamines, anticholinergics, hypnotics, and minimize opiates while controlling for pain as these medications may exacerbate delirium. Cues for day/night will assist with keeping patient calm and oriented - during daytime, please keep adequate light in room (open windows, lights on) and please keep room dim at night-time to encourage normal sleep-wake cycles  · continue risperidone  · Continue ropinirole for RLS     Essential hypertension    Chronic, not controlled  Continue losartan 100mg daily and metoprolol succinate 100mg nightly  Add low dose amlodipine  Monitor and adjust regimen as needed       Type 2 diabetes mellitus    · Chronic, controlled  · A1C 6  · Family reports diet controlled  · Continue to treat hyperglycemia with Low dose sliding scale     Hypothyroid    · Continue levothyroxine  · TSH, T4 WNL  · F/u with PCP upon discharge for ongoing outpatient monitoring      Cataracts, bilateral    · Continue home drops: difluprednate     Wrist pain, acute, left    · Pt seen at [facility]  1/19 per chart review: wrist imaging unremarkable, "suspect her pain is likely due to some sort of trauma, though infection is possibility"  · neurovascularly intact on exam; L wrist in ACE wrap  · afebrile without leukocytosis here  · continue keflex with PPI and Zofran  · PT/OT  · Keep extremity elevated  · Apply warm compresses     Hypomagnesemia    · Mag 1.7, continue to monitor and replace as needed  · Continue oral suupplement     Cardiac rhythm disorder or disturbance or change    · Pt daughter reports a-fib ~4 yrs ago; well controlled  · continue toprol 100mg qhs  · tele  · EKG NSR       VTE Risk Mitigation (From admission, onward)        Ordered     heparin (porcine) injection 5,000 Units  Every 8 hours      01/20/19 1452     IP VTE " HIGH RISK PATIENT  Once      01/20/19 1453          Olimpia Ramirez NP  Department of Hospital Medicine   [facility]

## 2019-01-22 NOTE — PT/OT/SLP EVAL
Physical Therapy Evaluation    Patient Name:  Gardenia Ferrer   MRN:  [id number]    Recommendations:     Discharge Recommendations:  nursing facility, skilled   Discharge Equipment Recommendations: (TBD)   Barriers to discharge: Inaccessible home, Decreased caregiver support and pt cognitive and functional mobiltity limitations    Assessment:     Gardenia Ferrer is a 89 y.o. female admitted with a medical diagnosis of Syncope.  She presents with the following impairments/functional limitations:  weakness, impaired endurance, gait instability, impaired balance, impaired functional mobilty, decreased lower extremity function, decreased upper extremity function, impaired cognition, decreased safety awareness Patient tolerated evaluation  poorly. Patient limited at this time by increased confusion and refusal with all attempts at mobilty. Eventually pt agreed to sit up in chair for her meal and was able to gait x  15 feet with mod A using RW.  Patient will continue to require skilled PT services to address the above impairments to return to prior level of function as independent as possible.     Discharge recommendation  to skilled nursing facility  to maximize functional mobility, safety and decrease caregiver burden prior to returning home.         Rehab Prognosis: Fair; patient would benefit from acute skilled PT services to address these deficits and reach maximum level of function.    Recent Surgery: * No surgery found *      Plan:     During this hospitalization, patient to be seen 3 x/week to address the identified rehab impairments via gait training, therapeutic activities, therapeutic exercises, neuromuscular re-education and progress toward the following goals:    · Plan of Care Expires:  02/22/19    Subjective     Chief Complaint: pt tangential and confused   Patient/Family Comments/goals: pt tangential and confused. Stating she would not walk until she went home  Pain/Comfort:  · Pain Rating 1:  0/10  · Pain Rating Post-Intervention 1: 0/10    Patients cultural, spiritual, Sikh conflicts given the current situation: no    Living Environment:  PT spoke to pt daughter. Per daughter pt was mod ( I) with gait using RW and req some A for ADLs such as bathing and dressing. Pt was a pace member and able to participate during the day as pt daughter works.     Pt very confused and answering questions inappropriately. Pt states she lives with her sister.     Equipment used at home: bedside commode, walker, standard, wheelchair.  DME owned (not currently used): none.  Upon discharge, patient will have assistance from no one at this time. Daughter primary caregiver who works full time.    Objective:     Communicated with RN  prior to session.  Patient found all lines intact, call button in reach and with  (no active lines)  upon PT entry to room.    General Precautions: Standard, fall   Orthopedic Precautions:N/A   Braces: N/A         Exams    Cognition:  · Patient is Oriented X person only, Alert, Agitated, Distractible, Uncooperative and Tangential  · Patient Follows one-step commands 70 % of the time.     Coordination  · increasd post lean for initial static standing but improved with gait    Sensation  · Patient's sensation was appears intact to joint position sense and light touch  bilaterally at UE/LE but unable to ascertain formally 2/2 cognition    Skin integrity    · -       Edema: Mild with redness at L wrist; wrapped in ace bandage     Posture  · -       Rounded shoulders  · -       Forward head    ROM and Strength  BLE strength, ROM  WFL      Functional Mobilty    · Bed Mobility:  Rolling Left:  maximal assistance  · Supine to Sit: maximal assistance  · Transfers:  Sit to Stand:  maximal assistance with rolling walker  · Bed to Chair: moderate assistance with  rolling walker  using  Step Transfer      Gait    · Patient ambulated FWB/WBAT: bilateral lower extremity 15  feet Moderate Assistance on level  tile with Rolling Walker  .   · Pdemonstarting a  2-point gait and swing-to gait with decreased salina, increased time in double stance, decreased velocity of limb motion, decreased step length, decreased stride length and decreased weight-shifting ability.Impairments contributing to gait deviations include impaired balance, impaired coordination and decreased strength  · Patient given  verbal and physical cues for RW mgmt, WS and RW proximity    Sitting Balance Static  Dynamic     Standby Assistance with no UE support Contact Guard Assistance}  with UE support   Standing Balance Moderate Assistance  with RW for BUE support Moderate Assistance  with BUE support from RW            Therapeutic Activities and Exercises:       Patient education  · Patient educated on the role of PT and POC  · Patient educated on importance  activity while in the hosptial per tolerance for improved endurance and to limit deconditioning   · Patient educated on safe transfers with nursing as appropriate  · Patient educated on energy conservation, pursed lip breathing  · Patient educated on proper transfer mechanics and safety  · All of patients questions were answered within the scope of PT        AM-PAC 6 CLICK MOBILITY  Total Score:13     Patient left up in chair with all lines intact, call button in reach, RN notified and PCT present.    GOALS:   Multidisciplinary Problems     Physical Therapy Goals        Problem: Physical Therapy Goal    Goal Priority Disciplines Outcome Goal Variances Interventions   Physical Therapy Goal     PT, PT/OT Ongoing (interventions implemented as appropriate)     Description:  Goals to be met by: 19     Patient will increase functional independence with mobility by performin. Supine to sit with MInimal Assistance  2. Sit to supine with MInimal Assistance  3. Sit to stand transfer with Minimal Assistance  4. Bed to chair transfer with Minimal Assistance using Rolling Walker or LRD.   5. Gait  x  50 feet with Minimal Assistance using Rolling Walker.   6. Lower extremity exercise program x20 reps per handout, with assistance as needed                      History:     Past Medical History:   Diagnosis Date    Asthma     Depression     Hyperlipidemia     Hypertension     Pneumonia     Renal disorder     Restless leg syndrome     Thyroid disease        Past Surgical History:   Procedure Laterality Date    CARPAL TUNNEL RELEASE      CHOLECYSTECTOMY      dementia      EGD (ESOPHAGOGASTRODUODENOSCOPY) Left 4/17/2013    Per[facility] by Pillo [facility] at [facility] ENDO    EYE SURGERY      Cataract    HYSTERECTOMY      TONSILLECTOMY         Clinical Decision Making:     History  Co-morbidities and personal factors that may impact the plan of care Examination  Body Structures and Functions, activity limitations and participation restrictions that may impact the plan of care Clinical Presentation   Decision Making/ Complexity Score   Co-morbidities:   [] Time since onset of injury / illness / exacerbation  [] Status of current condition  []Patient's cognitive status and safety concerns    [] Multiple Medical Problems (see med hx)  Personal Factors:   [] Patient's age  [] Prior Level of function   [] Patient's home situation (environment and family support)  [] Patient's level of motivation  [] Expected progression of patient      HISTORY:(criteria)    [] 56768 - no personal factors/history    [] 60356 - has 1-2 personal factor/comorbidity     [] 21224 - has >3 personal factor/comorbidity     Body Regions:  [] Objective examination findings  [] Head     []  Neck  [] Trunk   [] Upper Extremity  [] Lower Extremity    Body Systems:  [] For communication ability, affect, cognition, language, and learning style: the assessment of the ability to make needs known, consciousness, orientation (person, place, and time), expected emotional /behavioral responses, and learning preferences (eg, learning barriers,  education  needs)  [] For the neuromuscular system: a general assessment of gross coordinated movement (eg, balance, gait, locomotion, transfers, and transitions) and motor function  (motor control and motor learning)  [] For the musculoskeletal system: the assessment of gross symmetry, gross range of motion, gross strength, height, and weight  [] For the integumentary system: the assessment of pliability(texture), presence of scar formation, skin color, and skin integrity  [] For cardiovascular/pulmonary system: the assessment of heart rate, respiratory rate, blood pressure, and edema     Activity limitations:    [] Patient's cognitive status and saf ety concerns          [] Status of current condition      [] Weight bearing restriction  [] Cardiopulmunary Restriction    Participation Restrictions:   [] Goals and goal agreement with the patient     [] Rehab potential (prognosis) and probable outcome      Examination of Body System: (criteria)    [] 24343 - addressing 1-2 elements    [] 24372 - addressing a total of 3 or more elements     [] 49868 -  Addressing a total of 4 or more elements         Clinical Presentation: (criteria)  Choose one     On examination of body system using standardized tests and measures patient presents with (CHOOSE ONE) elements from any of the following: body structures and functions, activity limitations, and/or participation restrictions.  Leading to a clinical presentation that is considered (CHOOSE ONE)                              Clinical Decision Making  (Eval Complexity):  Choose One     Time Tracking:     PT Received On: 01/22/19  PT Start Time: 1049     PT Stop Time: 1109  PT Total Time (min): 20 min     Billable Minutes: Evaluation 15 min      Flako La PT  01/22/2019

## 2019-01-22 NOTE — PLAN OF CARE
Problem: Physical Therapy Goal  Goal: Physical Therapy Goal  Goals to be met by: 19     Patient will increase functional independence with mobility by performin. Supine to sit with MInimal Assistance  2. Sit to supine with MInimal Assistance  3. Sit to stand transfer with Minimal Assistance  4. Bed to chair transfer with Minimal Assistance using Rolling Walker or LRD.   5. Gait  x 50 feet with Minimal Assistance using Rolling Walker.   6. Lower extremity exercise program x20 reps per handout, with assistance as needed    Outcome: Ongoing (interventions implemented as appropriate)  Patient evaluated today. All goals established are appropriate for patient progression at this time.   Flako La PT, DPT  2019  Pager: [phone]

## 2019-01-22 NOTE — SUBJECTIVE & OBJECTIVE
Interval History: Patient seen at bedside, doing well, reports rash to under breast, denies pain, here for placement.     Review of Systems   Unable to perform ROS: Dementia (did c/o rash to breast)   Constitutional: Positive for activity change.     Scheduled Meds:   amLODIPine  2.5 mg Oral Daily    cephALEXin  500 mg Oral Q12H    difluprednate  1 drop Left Eye QHS    DULoxetine  20 mg Oral Daily    ferrous sulfate  325 mg Oral Once per day on Mon Wed Fri    heparin (porcine)  5,000 Units Subcutaneous Q8H    levothyroxine  50 mcg Oral Daily    losartan  100 mg Oral Daily    magnesium oxide  400 mg Oral Daily    metoprolol succinate  100 mg Oral QHS    miconazole NITRATE 2 %   Topical (Top) BID    pantoprazole  40 mg Oral BID    polyethylene glycol  17 g Oral Daily    potassium chloride  10 mEq Oral Daily    risperiDONE  0.25 mg Oral QHS    rOPINIRole  1 mg Oral BID     Continuous Infusions:  PRN Meds:.acetaminophen, albuterol, bisacodyl, dextrose 50%, dextrose 50%, glucagon (human recombinant), glucose, glucose, hydrOXYzine HCl, influenza, insulin aspart U-100, ondansetron, pneumoc 13-gerard conj-dip cr(PF), promethazine (PHENERGAN) IVPB, ramelteon, sodium chloride 0.9%    Objective:     Vital Signs (Most Recent):  Temp: 97.9 °F (36.6 °C) (01/22/19 1116)  Pulse: (!) 59 (01/22/19 1155)  Resp: 18 (01/22/19 1116)  BP: (!) 176/77 (01/22/19 1116)  SpO2: 100 % (01/22/19 1116) Vital Signs (24h Range):  Temp:  [96.5 °F (35.8 °C)-98.5 °F (36.9 °C)] 97.9 °F (36.6 °C)  Pulse:  [57-83] 59  Resp:  [18] 18  SpO2:  [95 %-100 %] 100 %  BP: (132-180)/(60-92) 176/77     Weight: 59.8 kg (131 lb 13.4 oz)  Body mass index is 25.75 kg/m².    Intake/Output Summary (Last 24 hours) at 1/22/2019 1339  Last data filed at 1/22/2019 0800  Gross per 24 hour   Intake 240 ml   Output --   Net 240 ml      Physical Exam   Constitutional: She appears well-developed and well-nourished. No distress.   Cardiovascular: Normal rate, regular  rhythm and normal heart sounds. Exam reveals no gallop and no friction rub.   No murmur heard.  Pulmonary/Chest: Effort normal and breath sounds normal. No respiratory distress. She has no wheezes. She has no rales.   Abdominal: Soft. Bowel sounds are normal. She exhibits no distension. There is no tenderness.   Musculoskeletal: She exhibits no edema or tenderness.   Neurological: She is alert.   Oriented to self and situation    Skin: Skin is warm and dry. No rash noted. She is not diaphoretic. No cyanosis. Nails show no clubbing.   Erythema to left wrist, mild erythema under left breast   Psychiatric: She has a normal mood and affect. Her behavior is normal.       Significant Labs:   Recent Labs   Lab 01/20/19  0856 01/21/19  0505 01/22/19  0601   WBC 9.68 5.78 5.89   HGB 11.9* 10.6* 11.1*   HCT 38.1 33.9* 35.5*    255 282     Recent Labs   Lab 01/20/19  0856 01/21/19  0505 01/22/19  0601    135* 135*   K 4.2 4.3 4.2    102 100   CO2 24 26 31*   BUN 26* 26* 19   CREATININE 0.8 0.8 0.8   CALCIUM 10.6* 10.0 10.4   PROT 7.0  --   --    BILITOT 0.8  --   --    ALKPHOS 165*  --   --    ALT 11  --   --    AST 17  --   --      Lab Results   Component Value Date    LABPROT 12.8 (H) 04/17/2013    ALBUMIN 3.1 (L) 01/20/2019     Lab Results   Component Value Date    CALCIUM 10.4 01/22/2019    PHOS 2.7 01/22/2019     POCT Glucose   Date Value Ref Range Status   01/22/2019 117 (H) 70 - 110 mg/dL Final   01/22/2019 112 (H) 70 - 110 mg/dL Final   01/21/2019 93 70 - 110 mg/dL Final   01/21/2019 119 (H) 70 - 110 mg/dL Final   01/21/2019 113 (H) 70 - 110 mg/dL Final   01/21/2019 101 70 - 110 mg/dL Final   01/20/2019 116 (H) 70 - 110 mg/dL Final     Lab Results   Component Value Date    HGBA1C 6.0 (H) 01/21/2019       Significant Imaging: na

## 2019-01-22 NOTE — ASSESSMENT & PLAN NOTE
Chronic, not controlled  Continue losartan 100mg daily and metoprolol succinate 100mg nightly  Add low dose amlodipine  Monitor and adjust regimen as needed

## 2019-01-22 NOTE — PLAN OF CARE
CM placed call to pt's daughter to discuss pt's discharge plan. Pt's daughter, Ann Page ([phone]) reports she would like Nursing Home placement due to inability to provide 24 hour supervision and care at home. Ann states patient is a member of the [facility] program and YOLANDA Tamayo ( [phone]) is the assigned .    KENDRA placed call to YOLANDA Tamayo to request assistance with pt's discharge to NH.Belkis did not answer call, message left on voicemail requesting return call to this CM.

## 2019-01-22 NOTE — ASSESSMENT & PLAN NOTE
· Chronic, controlled  · A1C 6  · Family reports diet controlled  · Continue to treat hyperglycemia with Low dose sliding scale

## 2019-01-22 NOTE — ASSESSMENT & PLAN NOTE
"Dementia  · Witnessed episode of emesis followed by syncope; daughter reports patient "was out for less than a minute"  · suspect emesis 2/2 new abx administration for L wrist swelling; likely vasovagal sycnope following emesis  ·  trop neg x 2  · UA and CXR unremarkable  · TTE shows mild diastolic dysfx with pEF 65%  · PTOT consulted; daughter is open to NH placement- will discuss with CM tomorrow  · Delirium precautions:  · Avoid antihistamines, anticholinergics, hypnotics, and minimize opiates while controlling for pain as these medications may exacerbate delirium. Cues for day/night will assist with keeping patient calm and oriented - during daytime, please keep adequate light in room (open windows, lights on) and please keep room dim at night-time to encourage normal sleep-wake cycles  · continue risperidone  · Continue ropinirole for RLS  "
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
## 2019-01-22 NOTE — ASSESSMENT & PLAN NOTE
· Continue levothyroxine  · TSH, T4 WNL  · F/u with PCP upon discharge for ongoing outpatient monitoring

## 2019-01-22 NOTE — ASSESSMENT & PLAN NOTE
· Pt daughter reports a-fib ~4 yrs ago; well controlled  · continue toprol 100mg qhs  · tele  · EKG NSR

## 2019-01-23 LAB
ANION GAP SERPL CALC-SCNC: 10 MMOL/L
BASOPHILS # BLD AUTO: 0.06 K/UL
BASOPHILS NFR BLD: 1.1 %
BUN SERPL-MCNC: 21 MG/DL
CALCIUM SERPL-MCNC: 10.3 MG/DL
CHLORIDE SERPL-SCNC: 99 MMOL/L
CO2 SERPL-SCNC: 28 MMOL/L
CREAT SERPL-MCNC: 0.9 MG/DL
DIFFERENTIAL METHOD: ABNORMAL
EOSINOPHIL # BLD AUTO: 0.4 K/UL
EOSINOPHIL NFR BLD: 8.3 %
ERYTHROCYTE [DISTWIDTH] IN BLOOD BY AUTOMATED COUNT: 12.4 %
EST. GFR  (AFRICAN AMERICAN): >60 ML/MIN/1.73 M^2
EST. GFR  (NON AFRICAN AMERICAN): 56.9 ML/MIN/1.73 M^2
GLUCOSE SERPL-MCNC: 110 MG/DL
HCT VFR BLD AUTO: 36.2 %
HGB BLD-MCNC: 11.3 G/DL
IMM GRANULOCYTES # BLD AUTO: 0.03 K/UL
IMM GRANULOCYTES NFR BLD AUTO: 0.6 %
LYMPHOCYTES # BLD AUTO: 1.3 K/UL
LYMPHOCYTES NFR BLD: 24.1 %
MAGNESIUM SERPL-MCNC: 1.7 MG/DL
MCH RBC QN AUTO: 29.9 PG
MCHC RBC AUTO-ENTMCNC: 31.2 G/DL
MCV RBC AUTO: 96 FL
MONOCYTES # BLD AUTO: 0.8 K/UL
MONOCYTES NFR BLD: 14.6 %
NEUTROPHILS # BLD AUTO: 2.7 K/UL
NEUTROPHILS NFR BLD: 51.3 %
NRBC BLD-RTO: 0 /100 WBC
PHOSPHATE SERPL-MCNC: 3 MG/DL
PLATELET # BLD AUTO: 287 K/UL
PMV BLD AUTO: 9.9 FL
POCT GLUCOSE: 116 MG/DL (ref 70–110)
POTASSIUM SERPL-SCNC: 4.2 MMOL/L
RBC # BLD AUTO: 3.78 M/UL
SODIUM SERPL-SCNC: 137 MMOL/L
TB INDURATION 48 - 72 HR READ: 0 0 MM
WBC # BLD AUTO: 5.28 K/UL

## 2019-01-23 PROCEDURE — 25000003 PHARM REV CODE 250: Performed by: NURSE PRACTITIONER

## 2019-01-23 PROCEDURE — 63600175 PHARM REV CODE 636 W HCPCS: Performed by: PHYSICIAN ASSISTANT

## 2019-01-23 PROCEDURE — 80048 BASIC METABOLIC PNL TOTAL CA: CPT

## 2019-01-23 PROCEDURE — 83735 ASSAY OF MAGNESIUM: CPT

## 2019-01-23 PROCEDURE — 97116 GAIT TRAINING THERAPY: CPT

## 2019-01-23 PROCEDURE — 97165 OT EVAL LOW COMPLEX 30 MIN: CPT

## 2019-01-23 PROCEDURE — 99224 PR SUBSEQUENT OBSERVATION CARE,LEVEL I: CPT | Mod: ,,, | Performed by: NURSE PRACTITIONER

## 2019-01-23 PROCEDURE — 85025 COMPLETE CBC W/AUTO DIFF WBC: CPT

## 2019-01-23 PROCEDURE — 97110 THERAPEUTIC EXERCISES: CPT

## 2019-01-23 PROCEDURE — 36415 COLL VENOUS BLD VENIPUNCTURE: CPT

## 2019-01-23 PROCEDURE — 84100 ASSAY OF PHOSPHORUS: CPT

## 2019-01-23 PROCEDURE — 82962 GLUCOSE BLOOD TEST: CPT

## 2019-01-23 PROCEDURE — 25000003 PHARM REV CODE 250: Performed by: PHYSICIAN ASSISTANT

## 2019-01-23 PROCEDURE — G0378 HOSPITAL OBSERVATION PER HR: HCPCS

## 2019-01-23 PROCEDURE — 99224 PR SUBSEQUENT OBSERVATION CARE,LEVEL I: ICD-10-PCS | Mod: ,,, | Performed by: NURSE PRACTITIONER

## 2019-01-23 RX ORDER — HYDROCORTISONE 25 MG/G
CREAM TOPICAL 3 TIMES DAILY PRN
Status: DISCONTINUED | OUTPATIENT
Start: 2019-01-24 | End: 2019-01-25 | Stop reason: HOSPADM

## 2019-01-23 RX ORDER — HYDROCORTISONE 25 MG/G
CREAM TOPICAL 3 TIMES DAILY
Status: COMPLETED | OUTPATIENT
Start: 2019-01-23 | End: 2019-01-24

## 2019-01-23 RX ADMIN — ROPINIROLE HYDROCHLORIDE 1 MG: 0.25 TABLET, FILM COATED ORAL at 08:01

## 2019-01-23 RX ADMIN — MICONAZOLE NITRATE: 20 POWDER TOPICAL at 08:01

## 2019-01-23 RX ADMIN — LEVOTHYROXINE SODIUM 50 MCG: 50 TABLET ORAL at 08:01

## 2019-01-23 RX ADMIN — POTASSIUM CHLORIDE 10 MEQ: 750 CAPSULE, EXTENDED RELEASE ORAL at 08:01

## 2019-01-23 RX ADMIN — POLYETHYLENE GLYCOL 3350 17 G: 17 POWDER, FOR SOLUTION ORAL at 08:01

## 2019-01-23 RX ADMIN — AMLODIPINE BESYLATE 2.5 MG: 2.5 TABLET ORAL at 08:01

## 2019-01-23 RX ADMIN — DULOXETINE HYDROCHLORIDE 20 MG: 20 CAPSULE, DELAYED RELEASE ORAL at 08:01

## 2019-01-23 RX ADMIN — MICONAZOLE NITRATE: 20 POWDER TOPICAL at 09:01

## 2019-01-23 RX ADMIN — HEPARIN SODIUM 5000 UNITS: 5000 INJECTION, SOLUTION INTRAVENOUS; SUBCUTANEOUS at 09:01

## 2019-01-23 RX ADMIN — FERROUS SULFATE TAB EC 325 MG (65 MG FE EQUIVALENT) 325 MG: 325 (65 FE) TABLET DELAYED RESPONSE at 11:01

## 2019-01-23 RX ADMIN — PANTOPRAZOLE SODIUM 40 MG: 40 TABLET, DELAYED RELEASE ORAL at 09:01

## 2019-01-23 RX ADMIN — DUREZOL 1 DROP: 0.5 EMULSION OPHTHALMIC at 09:01

## 2019-01-23 RX ADMIN — HYDROCORTISONE: 25 CREAM TOPICAL at 09:01

## 2019-01-23 RX ADMIN — HEPARIN SODIUM 5000 UNITS: 5000 INJECTION, SOLUTION INTRAVENOUS; SUBCUTANEOUS at 05:01

## 2019-01-23 RX ADMIN — ROPINIROLE HYDROCHLORIDE 1 MG: 0.25 TABLET, FILM COATED ORAL at 09:01

## 2019-01-23 RX ADMIN — LOSARTAN POTASSIUM 100 MG: 50 TABLET, FILM COATED ORAL at 08:01

## 2019-01-23 RX ADMIN — HEPARIN SODIUM 5000 UNITS: 5000 INJECTION, SOLUTION INTRAVENOUS; SUBCUTANEOUS at 01:01

## 2019-01-23 RX ADMIN — CEPHALEXIN 500 MG: 500 CAPSULE ORAL at 09:01

## 2019-01-23 RX ADMIN — PANTOPRAZOLE SODIUM 40 MG: 40 TABLET, DELAYED RELEASE ORAL at 08:01

## 2019-01-23 RX ADMIN — MAGNESIUM OXIDE TAB 400 MG (241.3 MG ELEMENTAL MG) 400 MG: 400 (241.3 MG) TAB at 08:01

## 2019-01-23 RX ADMIN — RISPERIDONE 0.25 MG: 0.25 TABLET ORAL at 09:01

## 2019-01-23 RX ADMIN — CEPHALEXIN 500 MG: 500 CAPSULE ORAL at 08:01

## 2019-01-23 RX ADMIN — HYDROCORTISONE: 25 CREAM TOPICAL at 01:01

## 2019-01-23 NOTE — SUBJECTIVE & OBJECTIVE
Interval History: Patient seen at bedside, doing well, scratching to left wrist, skin intact, erythema improved. Added hydrocortisone cream. Chi itching to breast area. Erythema improved to under breast    Review of Systems   Unable to perform ROS: Dementia (did c/o rash to breast)     Scheduled Meds:   amLODIPine  2.5 mg Oral Daily    cephALEXin  500 mg Oral Q12H    difluprednate  1 drop Left Eye QHS    DULoxetine  20 mg Oral Daily    ferrous sulfate  325 mg Oral Once per day on Mon Wed Fri    heparin (porcine)  5,000 Units Subcutaneous Q8H    hydrocortisone   Topical (Top) TID    levothyroxine  50 mcg Oral Daily    losartan  100 mg Oral Daily    magnesium oxide  400 mg Oral Daily    metoprolol succinate  100 mg Oral QHS    miconazole NITRATE 2 %   Topical (Top) BID    pantoprazole  40 mg Oral BID    polyethylene glycol  17 g Oral Daily    potassium chloride  10 mEq Oral Daily    risperiDONE  0.25 mg Oral QHS    rOPINIRole  1 mg Oral BID     Continuous Infusions:  PRN Meds:.acetaminophen, albuterol, bisacodyl, dextrose 50%, dextrose 50%, glucagon (human recombinant), glucose, glucose, hydrocortisone **FOLLOWED BY** [START ON 1/24/2019] hydrocortisone, hydrOXYzine HCl, influenza, insulin aspart U-100, ondansetron, pneumoc 13-gerard conj-dip cr(PF), promethazine (PHENERGAN) IVPB, ramelteon, sodium chloride 0.9%    Objective:     Vital Signs (Most Recent):  Temp: 97.3 °F (36.3 °C) (01/23/19 0705)  Pulse: (!) 56 (01/23/19 0718)  Resp: 17 (01/23/19 0705)  BP: (!) 144/66 (01/23/19 0705)  SpO2: 97 % (01/23/19 0705) Vital Signs (24h Range):  Temp:  [96.5 °F (35.8 °C)-98.1 °F (36.7 °C)] 97.3 °F (36.3 °C)  Pulse:  [51-79] 56  Resp:  [17-18] 17  SpO2:  [95 %-100 %] 97 %  BP: (144-178)/(66-77) 144/66     Weight: 59.8 kg (131 lb 13.4 oz)  Body mass index is 25.75 kg/m².    Intake/Output Summary (Last 24 hours) at 1/23/2019 0913  Last data filed at 1/22/2019 1830  Gross per 24 hour   Intake 480 ml   Output --    Net 480 ml      Physical Exam   Constitutional: She appears well-developed and well-nourished. No distress.   Cardiovascular: Normal rate, regular rhythm and normal heart sounds. Exam reveals no gallop and no friction rub.   No murmur heard.  Pulmonary/Chest: Effort normal and breath sounds normal. No respiratory distress. She has no wheezes. She has no rales.   Abdominal: Soft. Bowel sounds are normal. She exhibits no distension. There is no tenderness.   Musculoskeletal: She exhibits no edema or tenderness.   Neurological: She is alert.   Oriented to self, place, situation; cognition improving, stating she is in a place to get well but could not state hospital and unclear what happened to her   Skin: Skin is warm and dry. No rash noted. She is not diaphoretic. No cyanosis. Nails show no clubbing.   Improved Erythema to left wrist, mild erythema under left breast   Psychiatric: She has a normal mood and affect. Her behavior is normal.       Significant Labs:   Recent Labs   Lab 01/21/19  0505 01/22/19  0601 01/23/19  0623   WBC 5.78 5.89 5.28   HGB 10.6* 11.1* 11.3*   HCT 33.9* 35.5* 36.2*    282 287     Recent Labs   Lab 01/20/19  0856 01/21/19  0505 01/22/19  0601 01/23/19  0623    135* 135* 137   K 4.2 4.3 4.2 4.2    102 100 99   CO2 24 26 31* 28   BUN 26* 26* 19 21   CREATININE 0.8 0.8 0.8 0.9   CALCIUM 10.6* 10.0 10.4 10.3   PROT 7.0  --   --   --    BILITOT 0.8  --   --   --    ALKPHOS 165*  --   --   --    ALT 11  --   --   --    AST 17  --   --   --      Lab Results   Component Value Date    LABPROT 12.8 (H) 04/17/2013    ALBUMIN 3.1 (L) 01/20/2019     Lab Results   Component Value Date    CALCIUM 10.3 01/23/2019    PHOS 3.0 01/23/2019     POCT Glucose   Date Value Ref Range Status   01/22/2019 122 (H) 70 - 110 mg/dL Final   01/22/2019 118 (H) 70 - 110 mg/dL Final   01/22/2019 117 (H) 70 - 110 mg/dL Final   01/22/2019 112 (H) 70 - 110 mg/dL Final   01/21/2019 93 70 - 110 mg/dL Final    01/21/2019 119 (H) 70 - 110 mg/dL Final   01/21/2019 113 (H) 70 - 110 mg/dL Final   01/21/2019 101 70 - 110 mg/dL Final   01/20/2019 116 (H) 70 - 110 mg/dL Final     Lab Results   Component Value Date    HGBA1C 6.0 (H) 01/21/2019       Significant Imaging: na

## 2019-01-23 NOTE — PT/OT/SLP EVAL
"Occupational Therapy   Evaluation    Name: Gardenia Ferrer  MRN: [id number]  Admitting Diagnosis:  Syncope      Recommendations:     Discharge Recommendations: nursing facility, skilled  Discharge Equipment Recommendations:  (TBD)  Barriers to discharge:  (Increased level of skilled assistance with functional tasks, limited assistance at home ( daughter works full time)    Assessment:     Gardenia Ferrer is a 89 y.o. female with a medical diagnosis of Syncope.  She presents with alert and oriented to person and place only. Pt required encouragement throughout to participate in session. Performance deficits affecting function: weakness, impaired endurance, gait instability, impaired balance, impaired self care skills, impaired functional mobilty, impaired cognition.  Pt required max A with functional transfer from chair however declined further mobility. She would benefit from SNF following d/c to continue to progress towards goals and improve quality of life.     Rehab Prognosis: Good; patient would benefit from acute skilled OT services to address these deficits and reach maximum level of function.       Plan:     Patient to be seen 3 x/week to address the above listed problems via self-care/home management, therapeutic activities, therapeutic exercises, neuromuscular re-education  · Plan of Care Expires: 02/21/19  · Plan of Care Reviewed with: patient    Subjective     Chief Complaint: " I can usually do this at the house"   Patient/Family Comments/goals: Return to PLOF    Occupational Profile:  Living Environment: Pt lives with daughter  Previous level of function: Pt it mod I with functional mobility using RW and requiring some assistance with ADL ( showering, toileting)  Roles and Routines: home dweller, mother  Equipment Used at Home:  bedside commode, walker, standard, wheelchair  Assistance upon Discharge: Pt will have limited assistance from daughter ( daughter works full time)    Pain/Comfort:  · Pain " "Rating 1: (reports mild pain in L wrist ( did not rate))    Patients cultural, spiritual, Voodoo conflicts given the current situation: no    Objective:     Communicated with: RN prior to session.  Patient found up in chair with telemetry upon OT entry to room.    General Precautions: Standard, fall   Orthopedic Precautions:N/A   Braces: N/A     Occupational Performance:    Bed Mobility:    · NT, pt found seated UIC upon arrival     Functional Mobility/Transfers:  · Patient completed Sit <> Stand Transfer with maximal assistance  with  no assistive device   · Functional Mobility: Pt refused to participate in mobility ( pt states, " I don't want to do it here.. I can do it at home"    Activities of Daily Living:  · Upper Body Dressing: minimum assistance to bonita gown like jacket while seated UIC  · Lower Body Dressing: moderate assistance to bonita B shoes while seated UIC ( using crossing leg method)    Cognitive/Visual Perceptual:  Cognitive/Psychosocial Skills:     -       Oriented to: Person and Place   -       Follows Commands/attention:Follows one-step commands  -       Communication: clear/fluent  -       Memory: Dementia  -       Safety awareness/insight to disability: impaired   -       Mood/Affect/Coping skills/emotional control: Confusion  Visual/Perceptual:      -Intact ( wears glasses)  Hearing:   - hard of hearing    Physical Exam:  Postural examination/scapula alignment:    -       Rounded shoulders  Skin integrity: Visible skin intact  Edema:  Mild L wrist  Dominant hand:    -       RUE  Upper Extremity Range of Motion:     -       Right Upper Extremity: WFL  -       Left Upper Extremity: WFL  Upper Extremity Strength:    -       Right Upper Extremity: WFL 4/5  -       Left Upper Extremity: WFL 4/5   Strength:    -       Right Upper Extremity: WFL 4/5  -       Left Upper Extremity: WFL  4/5    AMPAC 6 Click ADL:  AMPAC Total Score: 14    Treatment & Education:  -Pt edu on OT " "role/POC  -Importance of OOB activity with staff assistance  ( UIC throughout the day with staff asssitance)  - White board updated  - Multiple self care tasks completed-- assistance level noted above  - All questions/concerns answered within OT scope of practice  Education:    Patient left up in chair with all lines intact, call button in reach and RN notified    GOALS:   Multidisciplinary Problems     Occupational Therapy Goals        Problem: Occupational Therapy Goal    Goal Priority Disciplines Outcome Interventions   Occupational Therapy Goal     OT, PT/OT Ongoing (interventions implemented as appropriate)    Description:  Goals to be met by: 19  Patient will increase functional independence with ADLs by performing:    UE Dressing with Minimal Assistance.  LE Dressing with Minimal Assistance.  Grooming while seated with Set-up Assistance and Stand-by Assistance.  Toileting from bedside commode with Moderate Assistance for hygiene and clothing management.   Supine to sit with Minimal Assistance.  Toilet transfer to bedside commode with Moderate Assistance.                      History:     Past Medical History:   Diagnosis Date    Asthma     Depression     Hyperlipidemia     Hypertension     Pneumonia     Renal disorder     Restless leg syndrome     Thyroid disease        Past Surgical History:   Procedure Laterality Date    CARPAL TUNNEL RELEASE      CHOLECYSTECTOMY      dementia      EGD (ESOPHAGOGASTRODUODENOSCOPY) Left 2013    Performed by [facility]    EYE SURGERY      Cataract    HYSTERECTOMY      TONSILLECTOMY         Clinical Decision Makin.  OT Low:  "Pt evaluation falls under low complexity for evaluation coding due to performance deficits noted in 1-3 areas as stated above and 0 co-morbities affecting current functional status. Data obtained from problem focused assessments. No modifications or assistance was required for completion of evaluation. " "Only brief occupational profile and history review completed."     Time Tracking:     OT Date of Treatment: 01/23/19  OT Start Time: 0948  OT Stop Time: 1008  OT Total Time (min): 20 min    Billable Minutes:Evaluation 20    Esha madrid OT  1/23/2019    "
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
## 2019-01-23 NOTE — PLAN OF CARE
KENDRA placed call to YOLANDA Tamayo with [facility] to obtain assistance with pt's discharge plan, awaiting return call.

## 2019-01-23 NOTE — PLAN OF CARE
Problem: Physical Therapy Goal  Goal: Physical Therapy Goal  Goals to be met by: 19     Patient will increase functional independence with mobility by performin. Supine to sit with MInimal Assistance- MET   Revised goal: Supine to sit with SBA  2. Sit to supine with MInimal Assistance  3. Sit to stand transfer with Minimal Assistance  4. Bed to chair transfer with Minimal Assistance using Rolling Walker or LRD.   5. Gait  x 50 feet with Minimal Assistance using Rolling Walker. - MET  Revised goal: gait x 150 feet with CGA using RW  6. Lower extremity exercise program x20 reps per handout, with assistance as needed     Outcome: Revised   goals met. Goals revised to continue with pt progression toward functional mobility ( I).   Flako La PT, DPT  2019  Pager: [phone]

## 2019-01-23 NOTE — ASSESSMENT & PLAN NOTE
· Chronic, better controlled  · Continue losartan 100mg daily, metoprolol succinate 100mg nightly,  and amlodipine 2.5mg  · Better controlled with adding amlodipine, denies dizziness   · Monitor and adjust regimen as needed

## 2019-01-23 NOTE — PLAN OF CARE
Problem: Occupational Therapy Goal  Goal: Occupational Therapy Goal  Goals to be met by: 2/2/19  Patient will increase functional independence with ADLs by performing:    UE Dressing with Minimal Assistance.  LE Dressing with Minimal Assistance.  Grooming while seated with Set-up Assistance and Stand-by Assistance.  Toileting from bedside commode with Moderate Assistance for hygiene and clothing management.   Supine to sit with Minimal Assistance.  Toilet transfer to bedside commode with Moderate Assistance.    Outcome: Ongoing (interventions implemented as appropriate)  Evaluation completed. Initiate POC.   Esha madrid OT  1/23/2019

## 2019-01-23 NOTE — PLAN OF CARE
KENDRA received call from YOLANDA Tamayo with [facility], stating she will fax a list of SNF and HH providers within [facility] network. CM request to receive info to fax # [phone], rod Cassidy CM and YOLANDA Chinchilla.

## 2019-01-23 NOTE — PROGRESS NOTES
"[facility] Hospital Medicine  Progress Note    Patient Name: Gardenia Ferrer  MRN: [id number]  Patient Class: OP- Observation   Admission Date: 1/20/2019  Length of Stay: 0 days  Attending Physician: Kristin Reid MD  Primary Care Provider: [facility] Medicine Team: [facility]OSP MED Z Olimpia Ramirez NP    Subjective:     Principal Problem:Syncope    HPI:  Gardenia Ferrer is a 89F with dementia, asthma, DM2, HTN, a-fib (daughter reports one episode 4 years ago) who presents to ED for witnessed syncope. Daughter at bedside provides history. She states that yesterday the patient was brought to [facility] ED for left wrist pain associated with swelling/erythema. Wrist imaging was unremarkable and the patient was discharged with Keflex. This AM the daughter gave the pt her Keflex and shortly after heard her coughing in her room. After the coughing fit, the patient vomited and then went "unresponsive" for less than a minute. Daughter states that the patient did not endorse any complaints prior to the syncopal episode. Patient has no complaints at time of my exam. No fevers at home per daughter.    Of note, in ED caregiver is tearful when discussing her abilities to take care of the patient. At this time she is interested in placement for the patient.     VSS, afebrile without leukocytosis. EKG no new ischemic changes, trop neg x 2, BNP WNL. UA unremarkable. CXR unremarkable.    Hospital Course:  Patient admitted for witnessed syncope. EKG no new ischemic changes and troponin WNL. TTE shows mod diastolic dysfunction with pEF 65%. Patient's daughter is sole caregiver and reports feeling overwhelmed at home with her mother's medical needs. She is agreeable to NH placement. Erythema to left wrist improving but patient scratching area, added hydrocortisone cream    PT OT consulted. Medically stable, CM/SW aware of placement needs.    Interval History: Patient seen at " bedside, doing well, scratching to left wrist, skin intact, erythema improved. Added hydrocortisone cream. Chi itching to breast area. Erythema improved to under breast    Review of Systems   Unable to perform ROS: Dementia (did c/o rash to breast)     Scheduled Meds:   amLODIPine  2.5 mg Oral Daily    cephALEXin  500 mg Oral Q12H    difluprednate  1 drop Left Eye QHS    DULoxetine  20 mg Oral Daily    ferrous sulfate  325 mg Oral Once per day on Mon Wed Fri    heparin (porcine)  5,000 Units Subcutaneous Q8H    hydrocortisone   Topical (Top) TID    levothyroxine  50 mcg Oral Daily    losartan  100 mg Oral Daily    magnesium oxide  400 mg Oral Daily    metoprolol succinate  100 mg Oral QHS    miconazole NITRATE 2 %   Topical (Top) BID    pantoprazole  40 mg Oral BID    polyethylene glycol  17 g Oral Daily    potassium chloride  10 mEq Oral Daily    risperiDONE  0.25 mg Oral QHS    rOPINIRole  1 mg Oral BID     Continuous Infusions:  PRN Meds:.acetaminophen, albuterol, bisacodyl, dextrose 50%, dextrose 50%, glucagon (human recombinant), glucose, glucose, hydrocortisone **FOLLOWED BY** [START ON 1/24/2019] hydrocortisone, hydrOXYzine HCl, influenza, insulin aspart U-100, ondansetron, pneumoc 13-gerard conj-dip cr(PF), promethazine (PHENERGAN) IVPB, ramelteon, sodium chloride 0.9%    Objective:     Vital Signs (Most Recent):  Temp: 97.3 °F (36.3 °C) (01/23/19 0705)  Pulse: (!) 56 (01/23/19 0718)  Resp: 17 (01/23/19 0705)  BP: (!) 144/66 (01/23/19 0705)  SpO2: 97 % (01/23/19 0705) Vital Signs (24h Range):  Temp:  [96.5 °F (35.8 °C)-98.1 °F (36.7 °C)] 97.3 °F (36.3 °C)  Pulse:  [51-79] 56  Resp:  [17-18] 17  SpO2:  [95 %-100 %] 97 %  BP: (144-178)/(66-77) 144/66     Weight: 59.8 kg (131 lb 13.4 oz)  Body mass index is 25.75 kg/m².    Intake/Output Summary (Last 24 hours) at 1/23/2019 0913  Last data filed at 1/22/2019 1830  Gross per 24 hour   Intake 480 ml   Output --   Net 480 ml      Physical Exam    Constitutional: She appears well-developed and well-nourished. No distress.   Cardiovascular: Normal rate, regular rhythm and normal heart sounds. Exam reveals no gallop and no friction rub.   No murmur heard.  Pulmonary/Chest: Effort normal and breath sounds normal. No respiratory distress. She has no wheezes. She has no rales.   Abdominal: Soft. Bowel sounds are normal. She exhibits no distension. There is no tenderness.   Musculoskeletal: She exhibits no edema or tenderness.   Neurological: She is alert.   Oriented to self, place, situation; cognition improving, stating she is in a place to get well but could not state hospital and unclear what happened to her   Skin: Skin is warm and dry. No rash noted. She is not diaphoretic. No cyanosis. Nails show no clubbing.   Improved Erythema to left wrist, mild erythema under left breast   Psychiatric: She has a normal mood and affect. Her behavior is normal.       Significant Labs:   Recent Labs   Lab 01/21/19  0505 01/22/19  0601 01/23/19  0623   WBC 5.78 5.89 5.28   HGB 10.6* 11.1* 11.3*   HCT 33.9* 35.5* 36.2*    282 287     Recent Labs   Lab 01/20/19  0856 01/21/19  0505 01/22/19  0601 01/23/19  0623    135* 135* 137   K 4.2 4.3 4.2 4.2    102 100 99   CO2 24 26 31* 28   BUN 26* 26* 19 21   CREATININE 0.8 0.8 0.8 0.9   CALCIUM 10.6* 10.0 10.4 10.3   PROT 7.0  --   --   --    BILITOT 0.8  --   --   --    ALKPHOS 165*  --   --   --    ALT 11  --   --   --    AST 17  --   --   --      Lab Results   Component Value Date    LABPROT 12.8 (H) 04/17/2013    ALBUMIN 3.1 (L) 01/20/2019     Lab Results   Component Value Date    CALCIUM 10.3 01/23/2019    PHOS 3.0 01/23/2019     POCT Glucose   Date Value Ref Range Status   01/22/2019 122 (H) 70 - 110 mg/dL Final   01/22/2019 118 (H) 70 - 110 mg/dL Final   01/22/2019 117 (H) 70 - 110 mg/dL Final   01/22/2019 112 (H) 70 - 110 mg/dL Final   01/21/2019 93 70 - 110 mg/dL Final   01/21/2019 119 (H) 70 - 110 mg/dL  "Final   01/21/2019 113 (H) 70 - 110 mg/dL Final   01/21/2019 101 70 - 110 mg/dL Final   01/20/2019 116 (H) 70 - 110 mg/dL Final     Lab Results   Component Value Date    HGBA1C 6.0 (H) 01/21/2019       Significant Imaging: na    Assessment/Plan:      * Syncope    Dementia  · Witnessed episode of emesis followed by syncope; daughter reports patient "was out for less than a minute"  · suspect emesis 2/2 new abx administration for L wrist swelling; likely vasovagal sycnope following emesis  ·  trop neg x 2  · UA and CXR unremarkable  · TTE shows mild diastolic dysfx with pEF 65%  · PTOT consulted; daughter is open to NH placement- will discuss with CM tomorrow  · Delirium precautions:  · Avoid antihistamines, anticholinergics, hypnotics, and minimize opiates while controlling for pain as these medications may exacerbate delirium. Cues for day/night will assist with keeping patient calm and oriented - during daytime, please keep adequate light in room (open windows, lights on) and please keep room dim at night-time to encourage normal sleep-wake cycles  · continue risperidone  · Continue ropinirole for RLS     Essential hypertension    · Chronic, better controlled  · Continue losartan 100mg daily, metoprolol succinate 100mg nightly,  and amlodipine 2.5mg  · Better controlled with adding amlodipine, denies dizziness   · Monitor and adjust regimen as needed     Type 2 diabetes mellitus    · Chronic, controlled  · A1C 6  · Family reports diet controlled  · Continue to treat hyperglycemia with Low dose sliding scale     Hypothyroid    · Continue levothyroxine  · TSH, T4 WNL  · F/u with PCP upon discharge for ongoing outpatient monitoring      Cataracts, bilateral    · Continue home drops: difluprednate     Wrist pain, acute, left    · Pt seen at [facility]  1/19 per chart review: wrist imaging unremarkable, "suspect her pain is likely due to some sort of trauma, though infection is possibility"  · neurovascularly intact on " exam; L wrist in ACE wrap  · afebrile without leukocytosis here  · continue keflex with PPI and Zofran  · PT/OT  · Keep extremity elevated  · Apply warm compresses  · Denying pain, improved erythema skin intact, reporting itching and scratching of site on exam, encourage not to scratch and added hydrocortisone cream     Hypomagnesemia    · Mag 1.7, continue to monitor and replace as needed  · Continue oral suupplement     Cardiac rhythm disorder or disturbance or change    · Pt daughter reports a-fib ~4 yrs ago; well controlled  · continue toprol 100mg qhs  · tele  · EKG NSR       VTE Risk Mitigation (From admission, onward)        Ordered     heparin (porcine) injection 5,000 Units  Every 8 hours      01/20/19 1452     IP VTE HIGH RISK PATIENT  Once      01/20/19 1452        Of note patient's daughter can no longer take care of the patient and is opting of NH placement. CM/SW aware.      Olimpia Ramirez NP  Department of Hospital Medicine   [facility]

## 2019-01-23 NOTE — ASSESSMENT & PLAN NOTE
"· Pt seen at [facility]  1/19 per chart review: wrist imaging unremarkable, "suspect her pain is likely due to some sort of trauma, though infection is possibility"  · neurovascularly intact on exam; L wrist in ACE wrap  · afebrile without leukocytosis here  · continue keflex with PPI and Zofran  · PT/OT  · Keep extremity elevated  · Apply warm compresses  · Denying pain, improved erythema skin intact, reporting itching and scratching of site on exam, encourage not to scratch and added hydrocortisone cream  "

## 2019-01-23 NOTE — PLAN OF CARE
CM received a call from pt's daughter, Ann Meyers with [facility] choices; 1, [facility], [facility]. [facility], [facility]

## 2019-01-23 NOTE — PLAN OF CARE
Problem: Adult Inpatient Plan of Care  Goal: Rounds/Family Conference  Outcome: Ongoing (interventions implemented as appropriate)  Pt is A/O x2; disoriented to time & place. Pleasantly confused; reorients well. Pt sat up in chair for 3-4 hr in am and up again at dinner time. Pt ambulated in romero with walker with PT & did very well. Pt denies pain. VSS. HR will dip to 40s while patient is sleeping; pt taking betablocker at bedtime. Slight edema, redness, & itching to L wrist; MD ordered hydrocortizone cream TID. Lungs CTA on RA. No cough noted. Pt incontinent of bladder but skin intact and absent of redness. LBM is 01/20; pt denies feeling constipated. Miralax given with am meds. Neuro checks done Q4; no changes. TB test read; negative. Plan for SNF placement.

## 2019-01-23 NOTE — PLAN OF CARE
CM received call from YOLANDA Tamayo, Frank Yao stating pt's daughter list of choices of facility are 1. [facility], 2. [facility] and 3. [facility]. KENDRA informed YOLANDA of choosen facilities to send referral..

## 2019-01-24 LAB
ANION GAP SERPL CALC-SCNC: 8 MMOL/L
BASOPHILS # BLD AUTO: 0.06 K/UL
BASOPHILS NFR BLD: 0.9 %
BUN SERPL-MCNC: 21 MG/DL
CALCIUM SERPL-MCNC: 10.4 MG/DL
CHLORIDE SERPL-SCNC: 100 MMOL/L
CO2 SERPL-SCNC: 29 MMOL/L
CREAT SERPL-MCNC: 0.8 MG/DL
DIFFERENTIAL METHOD: ABNORMAL
EOSINOPHIL # BLD AUTO: 0.6 K/UL
EOSINOPHIL NFR BLD: 8.7 %
ERYTHROCYTE [DISTWIDTH] IN BLOOD BY AUTOMATED COUNT: 12.8 %
EST. GFR  (AFRICAN AMERICAN): >60 ML/MIN/1.73 M^2
EST. GFR  (NON AFRICAN AMERICAN): >60 ML/MIN/1.73 M^2
GLUCOSE SERPL-MCNC: 110 MG/DL
HCT VFR BLD AUTO: 36.5 %
HGB BLD-MCNC: 11.8 G/DL
IMM GRANULOCYTES # BLD AUTO: 0.05 K/UL
IMM GRANULOCYTES NFR BLD AUTO: 0.8 %
LYMPHOCYTES # BLD AUTO: 1.7 K/UL
LYMPHOCYTES NFR BLD: 26.2 %
MAGNESIUM SERPL-MCNC: 1.9 MG/DL
MCH RBC QN AUTO: 30.4 PG
MCHC RBC AUTO-ENTMCNC: 32.3 G/DL
MCV RBC AUTO: 94 FL
MONOCYTES # BLD AUTO: 0.9 K/UL
MONOCYTES NFR BLD: 13.9 %
NEUTROPHILS # BLD AUTO: 3.2 K/UL
NEUTROPHILS NFR BLD: 49.5 %
NRBC BLD-RTO: 0 /100 WBC
PHOSPHATE SERPL-MCNC: 2.9 MG/DL
PLATELET # BLD AUTO: 292 K/UL
PMV BLD AUTO: 10.4 FL
POCT GLUCOSE: 107 MG/DL (ref 70–110)
POCT GLUCOSE: 111 MG/DL (ref 70–110)
POCT GLUCOSE: 125 MG/DL (ref 70–110)
POCT GLUCOSE: 137 MG/DL (ref 70–110)
POCT GLUCOSE: 142 MG/DL (ref 70–110)
POCT GLUCOSE: 280 MG/DL (ref 70–110)
POTASSIUM SERPL-SCNC: 4.2 MMOL/L
RBC # BLD AUTO: 3.88 M/UL
SODIUM SERPL-SCNC: 137 MMOL/L
WBC # BLD AUTO: 6.53 K/UL

## 2019-01-24 PROCEDURE — 25000003 PHARM REV CODE 250: Performed by: NURSE PRACTITIONER

## 2019-01-24 PROCEDURE — 99225 PR SUBSEQUENT OBSERVATION CARE,LEVEL II: CPT | Mod: ,,, | Performed by: NURSE PRACTITIONER

## 2019-01-24 PROCEDURE — 83735 ASSAY OF MAGNESIUM: CPT

## 2019-01-24 PROCEDURE — 25000003 PHARM REV CODE 250: Performed by: PHYSICIAN ASSISTANT

## 2019-01-24 PROCEDURE — 85025 COMPLETE CBC W/AUTO DIFF WBC: CPT

## 2019-01-24 PROCEDURE — 99225 PR SUBSEQUENT OBSERVATION CARE,LEVEL II: ICD-10-PCS | Mod: ,,, | Performed by: NURSE PRACTITIONER

## 2019-01-24 PROCEDURE — G0378 HOSPITAL OBSERVATION PER HR: HCPCS

## 2019-01-24 PROCEDURE — 82962 GLUCOSE BLOOD TEST: CPT

## 2019-01-24 PROCEDURE — 63600175 PHARM REV CODE 636 W HCPCS: Performed by: PHYSICIAN ASSISTANT

## 2019-01-24 PROCEDURE — 36415 COLL VENOUS BLD VENIPUNCTURE: CPT

## 2019-01-24 PROCEDURE — 84100 ASSAY OF PHOSPHORUS: CPT

## 2019-01-24 PROCEDURE — 80048 BASIC METABOLIC PNL TOTAL CA: CPT

## 2019-01-24 RX ADMIN — HEPARIN SODIUM 5000 UNITS: 5000 INJECTION, SOLUTION INTRAVENOUS; SUBCUTANEOUS at 09:01

## 2019-01-24 RX ADMIN — HEPARIN SODIUM 5000 UNITS: 5000 INJECTION, SOLUTION INTRAVENOUS; SUBCUTANEOUS at 02:01

## 2019-01-24 RX ADMIN — AMLODIPINE BESYLATE 2.5 MG: 2.5 TABLET ORAL at 08:01

## 2019-01-24 RX ADMIN — METOPROLOL SUCCINATE 100 MG: 50 TABLET, EXTENDED RELEASE ORAL at 09:01

## 2019-01-24 RX ADMIN — MICONAZOLE NITRATE: 20 POWDER TOPICAL at 09:01

## 2019-01-24 RX ADMIN — PANTOPRAZOLE SODIUM 40 MG: 40 TABLET, DELAYED RELEASE ORAL at 08:01

## 2019-01-24 RX ADMIN — PANTOPRAZOLE SODIUM 40 MG: 40 TABLET, DELAYED RELEASE ORAL at 09:01

## 2019-01-24 RX ADMIN — ROPINIROLE HYDROCHLORIDE 1 MG: 0.25 TABLET, FILM COATED ORAL at 08:01

## 2019-01-24 RX ADMIN — CEPHALEXIN 500 MG: 500 CAPSULE ORAL at 08:01

## 2019-01-24 RX ADMIN — HYDROXYZINE HYDROCHLORIDE 25 MG: 25 TABLET, FILM COATED ORAL at 01:01

## 2019-01-24 RX ADMIN — HEPARIN SODIUM 5000 UNITS: 5000 INJECTION, SOLUTION INTRAVENOUS; SUBCUTANEOUS at 06:01

## 2019-01-24 RX ADMIN — HYDROCORTISONE: 25 CREAM TOPICAL at 08:01

## 2019-01-24 RX ADMIN — POLYETHYLENE GLYCOL 3350 17 G: 17 POWDER, FOR SOLUTION ORAL at 08:01

## 2019-01-24 RX ADMIN — RISPERIDONE 0.25 MG: 0.25 TABLET ORAL at 09:01

## 2019-01-24 RX ADMIN — CEPHALEXIN 500 MG: 500 CAPSULE ORAL at 09:01

## 2019-01-24 RX ADMIN — DUREZOL 1 DROP: 0.5 EMULSION OPHTHALMIC at 09:01

## 2019-01-24 RX ADMIN — LOSARTAN POTASSIUM 100 MG: 50 TABLET, FILM COATED ORAL at 08:01

## 2019-01-24 RX ADMIN — DULOXETINE HYDROCHLORIDE 20 MG: 20 CAPSULE, DELAYED RELEASE ORAL at 08:01

## 2019-01-24 RX ADMIN — ROPINIROLE HYDROCHLORIDE 1 MG: 0.25 TABLET, FILM COATED ORAL at 09:01

## 2019-01-24 RX ADMIN — LEVOTHYROXINE SODIUM 50 MCG: 50 TABLET ORAL at 08:01

## 2019-01-24 RX ADMIN — POTASSIUM CHLORIDE 10 MEQ: 750 CAPSULE, EXTENDED RELEASE ORAL at 08:01

## 2019-01-24 RX ADMIN — MAGNESIUM OXIDE TAB 400 MG (241.3 MG ELEMENTAL MG) 400 MG: 400 (241.3 MG) TAB at 08:01

## 2019-01-24 RX ADMIN — MICONAZOLE NITRATE: 20 POWDER TOPICAL at 08:01

## 2019-01-24 NOTE — PLAN OF CARE
Updated face sheet  And clinical updates faxed to [facility], [facility] and [facility].      Amor Torre, LMSW [facility]   [phone]

## 2019-01-24 NOTE — ASSESSMENT & PLAN NOTE
"· Pt seen at [facility]  1/19 per chart review: wrist imaging unremarkable, "suspect her pain is likely due to some sort of trauma, though infection is possibility"  · neurovascularly intact on exam; L wrist in ACE wrap  · afebrile without leukocytosis here  · continue keflex with PPI and Zofran  · PT/OT  · Keep extremity elevated  · Apply warm compresses  · Denying pain, almost resolved erythema skin intact, improved itching and scratching of site on exam, encourage not to scratch continue hydrocortisone cream  "

## 2019-01-24 NOTE — PLAN OF CARE
Problem: Adult Inpatient Plan of Care  Goal: Plan of Care Review  Outcome: Ongoing (interventions implemented as appropriate)  Safety measures maintained. VSS on RA. Heart monitor in place. Up with stand by assist. Bed in lowest locked position and call light within reach. NAD noted. Will continue to monitor.

## 2019-01-24 NOTE — SUBJECTIVE & OBJECTIVE
Interval History: Patient seen at bedside, doing well, itching to left wrist improved. Overnight with bradycardia, metoprolol held, hypertensive this AM likely r/t holding BB. Spoke with nurse and to repeat BP after medication administration.    Review of Systems   Unable to perform ROS: Dementia (reports itching to left wrist improved)     Scheduled Meds:   amLODIPine  2.5 mg Oral Daily    cephALEXin  500 mg Oral Q12H    difluprednate  1 drop Left Eye QHS    DULoxetine  20 mg Oral Daily    ferrous sulfate  325 mg Oral Once per day on Mon Wed Fri    heparin (porcine)  5,000 Units Subcutaneous Q8H    levothyroxine  50 mcg Oral Daily    losartan  100 mg Oral Daily    magnesium oxide  400 mg Oral Daily    metoprolol succinate  100 mg Oral QHS    metoprolol succinate  75 mg Oral Once    miconazole NITRATE 2 %   Topical (Top) BID    pantoprazole  40 mg Oral BID    polyethylene glycol  17 g Oral Daily    potassium chloride  10 mEq Oral Daily    risperiDONE  0.25 mg Oral QHS    rOPINIRole  1 mg Oral BID     Continuous Infusions:  PRN Meds:.acetaminophen, albuterol, bisacodyl, dextrose 50%, dextrose 50%, glucagon (human recombinant), glucose, glucose, [COMPLETED] hydrocortisone **FOLLOWED BY** hydrocortisone, influenza, insulin aspart U-100, ondansetron, pneumoc 13-gerard conj-dip cr(PF), promethazine (PHENERGAN) IVPB, ramelteon, sodium chloride 0.9%    Objective:     Vital Signs (Most Recent):  Temp: 98 °F (36.7 °C) (01/24/19 0717)  Pulse: (!) 53 (01/24/19 0717)  Resp: 17 (01/24/19 0717)  BP: (!) 176/70 (01/24/19 0717)  SpO2: 97 % (01/24/19 0717) Vital Signs (24h Range):  Temp:  [96 °F (35.6 °C)-98 °F (36.7 °C)] 98 °F (36.7 °C)  Pulse:  [51-72] 53  Resp:  [17-18] 17  SpO2:  [97 %-100 %] 97 %  BP: (112-176)/(59-70) 176/70     Weight: 59.8 kg (131 lb 13.4 oz)  Body mass index is 25.75 kg/m².    Intake/Output Summary (Last 24 hours) at 1/24/2019 0842  Last data filed at 1/23/2019 1110  Gross per 24 hour   Intake  240 ml   Output --   Net 240 ml      Physical Exam   Constitutional: She appears well-developed and well-nourished. No distress.   Cardiovascular: Normal rate, regular rhythm and normal heart sounds. Exam reveals no gallop and no friction rub.   No murmur heard.  Pulmonary/Chest: Effort normal and breath sounds normal. No respiratory distress. She has no wheezes. She has no rales.   Abdominal: Soft. Bowel sounds are normal. She exhibits no distension. There is no tenderness.   Musculoskeletal: She exhibits no edema or tenderness.   Neurological: She is alert.   Oriented to self, place, situation; cognition improving, still she is unclear how and why she is in the hospital.    Skin: Skin is warm and dry. No rash noted. She is not diaphoretic. No cyanosis. Nails show no clubbing.   Almost resolved Erythema to left wrist, resolved erythema under left breast   Psychiatric: She has a normal mood and affect. Her behavior is normal.       Significant Labs:   Recent Labs   Lab 01/21/19  0505 01/22/19  0601 01/23/19  0623   WBC 5.78 5.89 5.28   HGB 10.6* 11.1* 11.3*   HCT 33.9* 35.5* 36.2*    282 287     Recent Labs   Lab 01/20/19  0856  01/22/19  0601 01/23/19  0623 01/24/19  0612      < > 135* 137 137   K 4.2   < > 4.2 4.2 4.2      < > 100 99 100   CO2 24   < > 31* 28 29   BUN 26*   < > 19 21 21   CREATININE 0.8   < > 0.8 0.9 0.8   CALCIUM 10.6*   < > 10.4 10.3 10.4   PROT 7.0  --   --   --   --    BILITOT 0.8  --   --   --   --    ALKPHOS 165*  --   --   --   --    ALT 11  --   --   --   --    AST 17  --   --   --   --     < > = values in this interval not displayed.     Lab Results   Component Value Date    LABPROT 12.8 (H) 04/17/2013    ALBUMIN 3.1 (L) 01/20/2019     Lab Results   Component Value Date    CALCIUM 10.4 01/24/2019    PHOS 2.9 01/24/2019     POCT Glucose   Date Value Ref Range Status   01/24/2019 107 70 - 110 mg/dL Final   01/23/2019 116 (H) 70 - 110 mg/dL Final   01/23/2019 137 (H) 70 -  110 mg/dL Final   01/23/2019 125 (H) 70 - 110 mg/dL Final   01/22/2019 122 (H) 70 - 110 mg/dL Final   01/22/2019 118 (H) 70 - 110 mg/dL Final   01/22/2019 117 (H) 70 - 110 mg/dL Final   01/22/2019 112 (H) 70 - 110 mg/dL Final   01/21/2019 93 70 - 110 mg/dL Final   01/21/2019 119 (H) 70 - 110 mg/dL Final   01/21/2019 113 (H) 70 - 110 mg/dL Final     Lab Results   Component Value Date    HGBA1C 6.0 (H) 01/21/2019       Significant Imaging: na

## 2019-01-24 NOTE — PLAN OF CARE
01/24/19 1029   Post-Acute Status   Post-Acute Authorization Placement   Post-Acute Placement Status Referrals Sent     Victor Blanchard, LMSW [facility]   [phone]

## 2019-01-24 NOTE — NURSING
Pt metoprolol held due to heart rate dropping in  Low to 40's, witnessed by myself and Marlin GRAY

## 2019-01-24 NOTE — PROGRESS NOTES
"[facility] Hospital Medicine  Progress Note    Patient Name: Gardenia Ferrer  MRN: [id number]  Patient Class: OP- Observation   Admission Date: 1/20/2019  Length of Stay: 0 days  Attending Physician: Kristin Reid MD  Primary Care Provider: [facility] Medicine Team: [facility] HOSP MED Z Olimpia Ramirez NP    Subjective:     Principal Problem:Syncope    HPI:  Gardenia Ferrer is a 89F with dementia, asthma, DM2, HTN, a-fib (daughter reports one episode 4 years ago) who presents to ED for witnessed syncope. Daughter at bedside provides history. She states that yesterday the patient was brought to [facility] ED for left wrist pain associated with swelling/erythema. Wrist imaging was unremarkable and the patient was discharged with Keflex. This AM the daughter gave the pt her Keflex and shortly after heard her coughing in her room. After the coughing fit, the patient vomited and then went "unresponsive" for less than a minute. Daughter states that the patient did not endorse any complaints prior to the syncopal episode. Patient has no complaints at time of my exam. No fevers at home per daughter.    Of note, in ED caregiver is tearful when discussing her abilities to take care of the patient. At this time she is interested in placement for the patient.     VSS, afebrile without leukocytosis. EKG no new ischemic changes, trop neg x 2, BNP WNL. UA unremarkable. CXR unremarkable.    Hospital Course:  Patient admitted for witnessed syncope. EKG no new ischemic changes and troponin WNL. TTE shows mod diastolic dysfunction with pEF 65%. Patient's daughter is sole caregiver and reports feeling overwhelmed at home with her mother's medical needs. She is agreeable to NH placement. Erythema to left wrist improved and itching treated with hydrocortisone cream which also improved. Patient is participating in Pt/OT. Medically stable, CM/SW aware of placement needs.    Interval " History: Patient seen at bedside, doing well, itching to left wrist improved. Overnight with bradycardia, metoprolol held, hypertensive this AM likely r/t holding BB. Spoke with nurse and to repeat BP after medication administration.    Review of Systems   Unable to perform ROS: Dementia (reports itching to left wrist improved)     Scheduled Meds:   amLODIPine  2.5 mg Oral Daily    cephALEXin  500 mg Oral Q12H    difluprednate  1 drop Left Eye QHS    DULoxetine  20 mg Oral Daily    ferrous sulfate  325 mg Oral Once per day on Mon Wed Fri    heparin (porcine)  5,000 Units Subcutaneous Q8H    levothyroxine  50 mcg Oral Daily    losartan  100 mg Oral Daily    magnesium oxide  400 mg Oral Daily    metoprolol succinate  100 mg Oral QHS    metoprolol succinate  75 mg Oral Once    miconazole NITRATE 2 %   Topical (Top) BID    pantoprazole  40 mg Oral BID    polyethylene glycol  17 g Oral Daily    potassium chloride  10 mEq Oral Daily    risperiDONE  0.25 mg Oral QHS    rOPINIRole  1 mg Oral BID     Continuous Infusions:  PRN Meds:.acetaminophen, albuterol, bisacodyl, dextrose 50%, dextrose 50%, glucagon (human recombinant), glucose, glucose, [COMPLETED] hydrocortisone **FOLLOWED BY** hydrocortisone, influenza, insulin aspart U-100, ondansetron, pneumoc 13-gerard conj-dip cr(PF), promethazine (PHENERGAN) IVPB, ramelteon, sodium chloride 0.9%    Objective:     Vital Signs (Most Recent):  Temp: 98 °F (36.7 °C) (01/24/19 0717)  Pulse: (!) 53 (01/24/19 0717)  Resp: 17 (01/24/19 0717)  BP: (!) 176/70 (01/24/19 0717)  SpO2: 97 % (01/24/19 0717) Vital Signs (24h Range):  Temp:  [96 °F (35.6 °C)-98 °F (36.7 °C)] 98 °F (36.7 °C)  Pulse:  [51-72] 53  Resp:  [17-18] 17  SpO2:  [97 %-100 %] 97 %  BP: (112-176)/(59-70) 176/70     Weight: 59.8 kg (131 lb 13.4 oz)  Body mass index is 25.75 kg/m².    Intake/Output Summary (Last 24 hours) at 1/24/2019 0842  Last data filed at 1/23/2019 1110  Gross per 24 hour   Intake 240 ml    Output --   Net 240 ml      Physical Exam   Constitutional: She appears well-developed and well-nourished. No distress.   Cardiovascular: Normal rate, regular rhythm and normal heart sounds. Exam reveals no gallop and no friction rub.   No murmur heard.  Pulmonary/Chest: Effort normal and breath sounds normal. No respiratory distress. She has no wheezes. She has no rales.   Abdominal: Soft. Bowel sounds are normal. She exhibits no distension. There is no tenderness.   Musculoskeletal: She exhibits no edema or tenderness.   Neurological: She is alert.   Oriented to self, place, situation; cognition improving, still she is unclear how and why she is in the hospital.    Skin: Skin is warm and dry. No rash noted. She is not diaphoretic. No cyanosis. Nails show no clubbing.   Almost resolved Erythema to left wrist, resolved erythema under left breast   Psychiatric: She has a normal mood and affect. Her behavior is normal.       Significant Labs:   Recent Labs   Lab 01/21/19  0505 01/22/19  0601 01/23/19  0623   WBC 5.78 5.89 5.28   HGB 10.6* 11.1* 11.3*   HCT 33.9* 35.5* 36.2*    282 287     Recent Labs   Lab 01/20/19  0856  01/22/19  0601 01/23/19  0623 01/24/19  0612      < > 135* 137 137   K 4.2   < > 4.2 4.2 4.2      < > 100 99 100   CO2 24   < > 31* 28 29   BUN 26*   < > 19 21 21   CREATININE 0.8   < > 0.8 0.9 0.8   CALCIUM 10.6*   < > 10.4 10.3 10.4   PROT 7.0  --   --   --   --    BILITOT 0.8  --   --   --   --    ALKPHOS 165*  --   --   --   --    ALT 11  --   --   --   --    AST 17  --   --   --   --     < > = values in this interval not displayed.     Lab Results   Component Value Date    LABPROT 12.8 (H) 04/17/2013    ALBUMIN 3.1 (L) 01/20/2019     Lab Results   Component Value Date    CALCIUM 10.4 01/24/2019    PHOS 2.9 01/24/2019     POCT Glucose   Date Value Ref Range Status   01/24/2019 107 70 - 110 mg/dL Final   01/23/2019 116 (H) 70 - 110 mg/dL Final   01/23/2019 137 (H) 70 - 110  "mg/dL Final   01/23/2019 125 (H) 70 - 110 mg/dL Final   01/22/2019 122 (H) 70 - 110 mg/dL Final   01/22/2019 118 (H) 70 - 110 mg/dL Final   01/22/2019 117 (H) 70 - 110 mg/dL Final   01/22/2019 112 (H) 70 - 110 mg/dL Final   01/21/2019 93 70 - 110 mg/dL Final   01/21/2019 119 (H) 70 - 110 mg/dL Final   01/21/2019 113 (H) 70 - 110 mg/dL Final     Lab Results   Component Value Date    HGBA1C 6.0 (H) 01/21/2019       Significant Imaging: na    Assessment/Plan:      * Syncope    Dementia  · Witnessed episode of emesis followed by syncope; daughter reports patient "was out for less than a minute"  · suspect emesis 2/2 new abx administration for L wrist swelling; likely vasovagal sycnope following emesis  ·  trop neg x 2  · UA and CXR unremarkable  · TTE shows mild diastolic dysfx with pEF 65%  · PTOT consulted; daughter is open to NH placement- will discuss with CM tomorrow  · Delirium precautions:  · Avoid antihistamines, anticholinergics, hypnotics, and minimize opiates while controlling for pain as these medications may exacerbate delirium. Cues for day/night will assist with keeping patient calm and oriented - during daytime, please keep adequate light in room (open windows, lights on) and please keep room dim at night-time to encourage normal sleep-wake cycles  · continue risperidone  · Continue ropinirole for RLS     Essential hypertension    · Chronic, better controlled  · Continue losartan 100mg daily, metoprolol succinate 100mg nightly,  and amlodipine 2.5mg  · Better controlled with adding amlodipine, denies dizziness   · Monitor and adjust regimen as needed     Type 2 diabetes mellitus    · Chronic, controlled  · A1C 6  · Family reports diet controlled  · Continue to treat hyperglycemia with Low dose sliding scale     Hypothyroid    · Continue levothyroxine  · TSH, T4 WNL  · F/u with PCP upon discharge for ongoing outpatient monitoring      Cataracts, bilateral    · Continue home drops: difluprednate     Wrist " "pain, acute, left    · Pt seen at [facility]  1/19 per chart review: wrist imaging unremarkable, "suspect her pain is likely due to some sort of trauma, though infection is possibility"  · neurovascularly intact on exam; L wrist in ACE wrap  · afebrile without leukocytosis here  · continue keflex with PPI and Zofran  · PT/OT  · Keep extremity elevated  · Apply warm compresses  · Denying pain, almost resolved erythema skin intact, improved itching and scratching of site on exam, encourage not to scratch continue hydrocortisone cream     Hypomagnesemia    · Mag 1.9, continue to monitor and replace as needed  · Continue oral supplement     Cardiac rhythm disorder or disturbance or change    · Pt daughter reports a-fib ~4 yrs ago; well controlled  · continue toprol 100mg qhs  · tele  · EKG NSR       VTE Risk Mitigation (From admission, onward)        Ordered     heparin (porcine) injection 5,000 Units  Every 8 hours      01/20/19 1452     IP VTE HIGH RISK PATIENT  Once      01/20/19 1452        Discharge plan update: [facility] patient with 2 pending SNF facility approvals      Olimpia Ramirez NP  Department of Hospital Medicine   [facility]  "

## 2019-01-25 VITALS
RESPIRATION RATE: 18 BRPM | SYSTOLIC BLOOD PRESSURE: 123 MMHG | BODY MASS INDEX: 25.88 KG/M2 | DIASTOLIC BLOOD PRESSURE: 58 MMHG | HEIGHT: 60 IN | HEART RATE: 71 BPM | TEMPERATURE: 98 F | WEIGHT: 131.81 LBS | OXYGEN SATURATION: 97 %

## 2019-01-25 LAB
ANION GAP SERPL CALC-SCNC: 9 MMOL/L
BASOPHILS # BLD AUTO: 0.08 K/UL
BASOPHILS NFR BLD: 1.2 %
BUN SERPL-MCNC: 19 MG/DL
CALCIUM SERPL-MCNC: 11.1 MG/DL
CHLORIDE SERPL-SCNC: 100 MMOL/L
CO2 SERPL-SCNC: 29 MMOL/L
CREAT SERPL-MCNC: 0.8 MG/DL
DIFFERENTIAL METHOD: ABNORMAL
EOSINOPHIL # BLD AUTO: 0.5 K/UL
EOSINOPHIL NFR BLD: 6.5 %
ERYTHROCYTE [DISTWIDTH] IN BLOOD BY AUTOMATED COUNT: 12.7 %
EST. GFR  (AFRICAN AMERICAN): >60 ML/MIN/1.73 M^2
EST. GFR  (NON AFRICAN AMERICAN): >60 ML/MIN/1.73 M^2
GLUCOSE SERPL-MCNC: 110 MG/DL
HCT VFR BLD AUTO: 38.8 %
HGB BLD-MCNC: 12.1 G/DL
IMM GRANULOCYTES # BLD AUTO: 0.07 K/UL
IMM GRANULOCYTES NFR BLD AUTO: 1 %
LYMPHOCYTES # BLD AUTO: 1.5 K/UL
LYMPHOCYTES NFR BLD: 21.6 %
MAGNESIUM SERPL-MCNC: 1.9 MG/DL
MCH RBC QN AUTO: 30.6 PG
MCHC RBC AUTO-ENTMCNC: 31.2 G/DL
MCV RBC AUTO: 98 FL
MONOCYTES # BLD AUTO: 0.8 K/UL
MONOCYTES NFR BLD: 11.1 %
NEUTROPHILS # BLD AUTO: 4.1 K/UL
NEUTROPHILS NFR BLD: 58.6 %
NRBC BLD-RTO: 0 /100 WBC
PHOSPHATE SERPL-MCNC: 2.9 MG/DL
PLATELET # BLD AUTO: 329 K/UL
PMV BLD AUTO: 10.1 FL
POCT GLUCOSE: 115 MG/DL (ref 70–110)
POCT GLUCOSE: 127 MG/DL (ref 70–110)
POTASSIUM SERPL-SCNC: 4.8 MMOL/L
RBC # BLD AUTO: 3.96 M/UL
SODIUM SERPL-SCNC: 138 MMOL/L
WBC # BLD AUTO: 6.91 K/UL

## 2019-01-25 PROCEDURE — 36415 COLL VENOUS BLD VENIPUNCTURE: CPT

## 2019-01-25 PROCEDURE — 85025 COMPLETE CBC W/AUTO DIFF WBC: CPT

## 2019-01-25 PROCEDURE — 99217 PR OBSERVATION CARE DISCHARGE: CPT | Mod: ,,, | Performed by: HOSPITALIST

## 2019-01-25 PROCEDURE — 83735 ASSAY OF MAGNESIUM: CPT

## 2019-01-25 PROCEDURE — 97112 NEUROMUSCULAR REEDUCATION: CPT

## 2019-01-25 PROCEDURE — 97530 THERAPEUTIC ACTIVITIES: CPT

## 2019-01-25 PROCEDURE — G0378 HOSPITAL OBSERVATION PER HR: HCPCS

## 2019-01-25 PROCEDURE — 80048 BASIC METABOLIC PNL TOTAL CA: CPT

## 2019-01-25 PROCEDURE — 25000003 PHARM REV CODE 250: Performed by: NURSE PRACTITIONER

## 2019-01-25 PROCEDURE — 84100 ASSAY OF PHOSPHORUS: CPT

## 2019-01-25 PROCEDURE — 63600175 PHARM REV CODE 636 W HCPCS: Performed by: PHYSICIAN ASSISTANT

## 2019-01-25 PROCEDURE — 25000003 PHARM REV CODE 250: Performed by: PHYSICIAN ASSISTANT

## 2019-01-25 PROCEDURE — 99217 PR OBSERVATION CARE DISCHARGE: ICD-10-PCS | Mod: ,,, | Performed by: HOSPITALIST

## 2019-01-25 RX ORDER — ACETAMINOPHEN 325 MG/1
650 TABLET ORAL EVERY 4 HOURS PRN
Refills: 0 | COMMUNITY
Start: 2019-01-25

## 2019-01-25 RX ORDER — MICONAZOLE NITRATE 2 %
POWDER (GRAM) TOPICAL 2 TIMES DAILY
Refills: 0 | COMMUNITY
Start: 2019-01-25

## 2019-01-25 RX ORDER — AMLODIPINE BESYLATE 2.5 MG/1
2.5 TABLET ORAL DAILY
Qty: 30 TABLET | Refills: 11 | Status: SHIPPED | OUTPATIENT
Start: 2019-01-26 | End: 2020-01-26

## 2019-01-25 RX ADMIN — HEPARIN SODIUM 5000 UNITS: 5000 INJECTION, SOLUTION INTRAVENOUS; SUBCUTANEOUS at 06:01

## 2019-01-25 RX ADMIN — POLYETHYLENE GLYCOL 3350 17 G: 17 POWDER, FOR SOLUTION ORAL at 10:01

## 2019-01-25 RX ADMIN — PANTOPRAZOLE SODIUM 40 MG: 40 TABLET, DELAYED RELEASE ORAL at 10:01

## 2019-01-25 RX ADMIN — CEPHALEXIN 500 MG: 500 CAPSULE ORAL at 10:01

## 2019-01-25 RX ADMIN — DULOXETINE HYDROCHLORIDE 20 MG: 20 CAPSULE, DELAYED RELEASE ORAL at 10:01

## 2019-01-25 RX ADMIN — FERROUS SULFATE TAB EC 325 MG (65 MG FE EQUIVALENT) 325 MG: 325 (65 FE) TABLET DELAYED RESPONSE at 10:01

## 2019-01-25 RX ADMIN — MAGNESIUM OXIDE TAB 400 MG (241.3 MG ELEMENTAL MG) 400 MG: 400 (241.3 MG) TAB at 10:01

## 2019-01-25 RX ADMIN — MICONAZOLE NITRATE: 20 POWDER TOPICAL at 10:01

## 2019-01-25 RX ADMIN — LEVOTHYROXINE SODIUM 50 MCG: 50 TABLET ORAL at 10:01

## 2019-01-25 RX ADMIN — AMLODIPINE BESYLATE 2.5 MG: 2.5 TABLET ORAL at 10:01

## 2019-01-25 RX ADMIN — POTASSIUM CHLORIDE 10 MEQ: 750 CAPSULE, EXTENDED RELEASE ORAL at 10:01

## 2019-01-25 RX ADMIN — ROPINIROLE HYDROCHLORIDE 1 MG: 0.25 TABLET, FILM COATED ORAL at 01:01

## 2019-01-25 RX ADMIN — LOSARTAN POTASSIUM 100 MG: 50 TABLET, FILM COATED ORAL at 10:01

## 2019-01-25 NOTE — PLAN OF CARE
Problem: Adult Inpatient Plan of Care  Goal: Plan of Care Review  Outcome: Ongoing (interventions implemented as appropriate)  PT resting in bed. No s/s of distress noted at this time. Left wrist/hand elevated when PT allows. Pt has been mostly incontinent tonight. Aware of self only and requiring reminders of where she is. PT and myself spoke with her daughter earlier this shift, this seemed to calm PT's anxiety. Medications given per MAR, no s/s of swallowing issues. Bed alarm active, bed in lowest position, bed rails upx3, door open, and near nurses station. Pt advised to use call light but feels more comfortable calling out for assistance. Will continue to monitor.

## 2019-01-25 NOTE — PT/OT/SLP PROGRESS
"Occupational Therapy   Treatment    Name: Gardenia Ferrer  MRN: [id number]  Admitting Diagnosis:  Syncope       Recommendations:     Discharge Recommendations: nursing facility, skilled  Discharge Equipment Recommendations:  walker, rolling  Barriers to discharge:  (increased level of skilled assistance needed for functional activities)    Assessment:     Gardenia Ferrer is a 89 y.o. female with a medical diagnosis of Syncope.  She presents with impaired ability to complete self care tasks and functional mobility without assistance due to R sided weakness and impaired endurance. Pt tolerated OT session well with no c/o increased pain with activity. Pt's posterior leaning affected her ability to complete upright standing and sitting without assistance or correction from therapist. Pt was in good spirits on this date. Pt displayed impaired cognition noted while she asked the same questions multiple times. Pt required redirection to task 75% of the session. Pt will continue to benefit from skilled OT to increase her self care independence and to provide caregiver education.     Performance deficits affecting function are weakness, impaired endurance, impaired self care skills, impaired cognition, impaired balance, impaired cardiopulmonary response to activity.     Rehab Prognosis:  Good; patient would benefit from acute skilled OT services to address these deficits and reach maximum level of function.       Plan:     Patient to be seen 3 x/week to address the above listed problems via self-care/home management, therapeutic activities, therapeutic exercises, neuromuscular re-education  · Plan of Care Expires: 02/21/19  · Plan of Care Reviewed with: patient    Subjective    "I'm ready to go home."  Pain/Comfort:  · Pain Rating 1: 0/10  · Pain Rating Post-Intervention 1: 0/10    Objective:     Communicated with: RN prior to session.  Patient found HOB elevated with telemetry upon OT entry to room.    General " Precautions: Standard, fall   Orthopedic Precautions:N/A   Braces: N/A     Occupational Performance:     Bed Mobility:    · Patient completed Rolling/Turning to Left with  contact guard assistance and HOB elevated  · Patient completed Rolling/Turning to Right with contact guard assistance and HOB elevated  · Patient completed Scooting/Bridging with minimum assistance, with side rail and HOB elevated for scooting EOB; HOB flat for posterior positioning in bed requiring mod A due to decreased pace and weakness while using B side rails   · Patient completed Supine to Sit with moderate assistance for trunk control and RLE advancement   · Patient completed Sit to Supine with moderate assistance for trunk control and RLE advancement    Functional Mobility/Transfers:  · Patient completed Sit <> Stand Transfer x2 with moderate assistance  with  rolling walker due to impaired initial standing balance, decreased force production, and weakness. Initiated anterior weighshifting in sitting and standing to prepare for functional activities. Pt was SBA for static sitting with RUE placed on side rail; min A for posterior leaning with no BUE support.   · Functional Mobility: NT this date 2/2 pt's preference due to RLE weakness    Activities of Daily Living:  · Upper Body Dressing: minimum assistance to don gown as jacket in sitting due to verbal cues to initiate and complete task      [facility] 6 Click ADL: 16    Treatment & Education:  Educated pt on the following:  - OT POC  - Importance of LE exercises in bed to prevent pressure sores: Pt reported of soreness at her ankle and heels   - Self care independence  - Bed mobility safety  - Functional transfer safety    Patient left HOB elevated with all lines intact, call button in reach and RN notifiedEducation:      GOALS:   Multidisciplinary Problems     Occupational Therapy Goals        Problem: Occupational Therapy Goal    Goal Priority Disciplines Outcome Interventions   Occupational  Therapy Goal     OT, PT/OT Ongoing (interventions implemented as appropriate)    Description:  Goals to be met by: 2/2/19  Patient will increase functional independence with ADLs by performing:    UE Dressing with Minimal Assistance.  LE Dressing with Minimal Assistance. - Progressing   Grooming while seated with Set-up Assistance and Stand-by Assistance.  Toileting from bedside commode with Moderate Assistance for hygiene and clothing management.   Supine to sit with Minimal Assistance.- Progressing   Toilet transfer to bedside commode with Moderate Assistance.                       Time Tracking:     OT Date of Treatment: 01/25/19  OT Start Time: 1143  OT Stop Time: 1225  OT Total Time (min): 42 min    Billable Minutes:Therapeutic Activity 30  Neuromuscular Re-education 12    Maranda Thomas OT  1/25/2019

## 2019-01-25 NOTE — PROGRESS NOTES
Pt discharged from unit.  Pt accepted to [facility].  Report was called to JAMMIE Kwon.  Pt assigned to [facility], Room 313.  All of pt's personal belongings sent with her, including right hearing aid, glasses, picture book, and notebook.  Pt's daughter will meet her there.  Pt left unit via stretcher w/ EMS.

## 2019-01-25 NOTE — ASSESSMENT & PLAN NOTE
"Dementia  · Witnessed episode of emesis followed by syncope; daughter reports patient "was out for less than a minute"  · suspect emesis 2/2 new abx administration for L wrist swelling; likely vasovagal sycnope following emesis  ·  trop neg x 2  · UA and CXR unremarkable  · TTE shows mild diastolic dysfx with pEF 65%  ·   · Delirium precautions:  · Avoid antihistamines, anticholinergics, hypnotics, and minimize opiates while controlling for pain as these medications may exacerbate delirium. Cues for day/night will assist with keeping patient calm and oriented - during daytime, please keep adequate light in room (open windows, lights on) and please keep room dim at night-time to encourage normal sleep-wake cycles  · continue risperidone  · Continue ropinirole for RLS  "

## 2019-01-25 NOTE — PLAN OF CARE
[facility]     Department of Hospital Medicine     [address] [place] [address]     [phone] after hours  [phone] fax       NURSING HOME ORDERS    01/25/2019    Admit to Nursing Home:  Skilled Bed                                                 Diagnoses:  Active Hospital Problems    Diagnosis  POA    *Syncope [R55]  Yes    Essential hypertension [I10]  Unknown    Wrist pain, acute, left [M25.532]  Yes    Dementia [F03.90]  Yes    Cataracts, bilateral [H26.9]  Yes    Hypothyroid [E03.9]  Yes    Type 2 diabetes mellitus [E11.9]  Yes    Cardiac rhythm disorder or disturbance or change [I49.9]  Yes    Hypomagnesemia [E83.42]  Yes      Resolved Hospital Problems    Diagnosis Date Resolved POA    Hypokalemia [E87.6] 01/22/2019 Yes       Patient is homebound due to:  Syncope    Allergies:  Review of patient's allergies indicates:   Allergen Reactions    Qualaquin [quinine sulfate] Hives       Vitals:  Every shift (Skilled Nursing patients)    Diet: Diabetic diet   Supplement:  1 can every three times a day with meals                         Type:    Glucerna       Acitivities:      - Up in a chair each morning as tolerated   - Ambulate with assistance to bathroom    LABS:  Per facility protocol    Nursing Precautions:     - Fall precautions per nursing home protocol   - Seizure precaution per [facility] protocol   - Decubitus precautions:        -  for positioning   - Pressure reducing foam mattress   - Turn patient every two hours. Use wedge pillows to anchor patient    CONSULTS:     Physical Therapy to evaluate and treat     Occupational Therapy to evaluate and treat     Speech Therapy  to evaluate and treat     Nutrition to evaluate and recommend diet      MISCELLANEOUS CARE:     Routine Skin for Bedridden Patients:  Apply moisture barrier cream to all    skin folds and wet areas in perineal area daily and after baths and                            all bowel movements.       For [facility] Patients Only:     Alert [facility]  prior to discharge.  Phone # [phone]            # [phone] after hours    Fax discharge orders to [facility]: Fax # [phone]   Fax discharge orders to [facility]: Fax # [phone]                    DIABETES CARE:     Check blood sugar:         Fingerstick blood sugar AC and HS   Fingerstick blood sugar every 6 hours if unable to eat      Report CBG < 60 or > 400 to physician.                                          Insulin Sliding Scale          Glucose  Novolog Insulin Subcutaneous        0 - 60   Orange juice or glucose tablet, hold insulin      No insulin   201-250  2 units   251-300  4 units   301-350  6 units   351-400  8 units   >400   10 units then call physician      Medications: Discontinue all previous medication orders, if any. See new list below.     Gardenia Ferrer   Home Medication Instructions TYLER:[id number]    Printed on:01/25/19 1130   Medication Information                      acetaminophen (TYLENOL) 325 MG tablet  Take 2 tablets (650 mg total) by mouth every 4 (four) hours as needed.             albuterol (PROAIR HFA) 90 mcg/actuation HFAA  Inhale 2 puffs into the lungs every 6 (six) hours as needed.             amLODIPine (NORVASC) 2.5 MG tablet  Take 1 tablet (2.5 mg total) by mouth once daily.             difluprednate (DUREZOL) 0.05 % Drop ophthalmic solution  1 drop. 1 drop in Left eye evening             DULoxetine (CYMBALTA) 20 MG capsule  Take 20 mg by mouth once daily.             ferrous sulfate 325 (65 FE) MG EC tablet  Take 325 mg by mouth 3 (three) times a week. Mon, Wed, Fri             latanoprost, PF, 0.005 % Drop  Apply to eye every evening. 1 drop in right eye at night             levothyroxine (SYNTHROID) 50 MCG tablet  Take 50 mcg by mouth once daily.             loratadine (CLARITIN) 10 mg tablet  Take 10 mg by mouth once daily.             losartan (COZAAR) 100 MG  tablet  Take 100 mg by mouth once daily.             magnesium oxide (MAG-OX) 400 mg tablet  Take 1 tablet (400 mg total) by mouth 2 (two) times daily.             metoprolol succinate (TOPROL-XL) 100 MG 24 hr tablet  Take 100 mg by mouth once daily.             miconazole NITRATE 2 % (MICOTIN) 2 % top powder  Apply topically 2 (two) times daily. Apply under breasts             pantoprazole (PROTONIX) 40 MG tablet  Take 1 tablet (40 mg total) by mouth 2 (two) times daily.             potassium chloride SA (K-DUR,KLOR-CON) 10 MEQ tablet  Take 10 mEq by mouth once daily.              risperiDONE (RISPERDAL) 0.25 MG Tab  Take by mouth every evening.             rOPINIRole (REQUIP) 1 MG tablet  Take 1 mg by mouth 2 (two) times daily.                       _________________________________  Kristin Reid MD  01/25/2019

## 2019-01-25 NOTE — PLAN OF CARE
Problem: Adult Inpatient Plan of Care  Goal: Plan of Care Review  Outcome: Ongoing (interventions implemented as appropriate)  Pt oriented to self only.  Safety precautions maintained, bed alarm activated.  Accuchecks ac/hs.  PT/OT.  Pt repositioned often.  Call light within reach.

## 2019-01-25 NOTE — PLAN OF CARE
01/25/19 1120   Post-Acute Status   Post-Acute Authorization Placement       [facility] has given auth to go to [facility].  NH calling the daughter to get her in to sign the paperwork.  Once that happens patient can be cleared to go to the NH.    Amor Torre LMSW  [facility]   [phone]

## 2019-01-25 NOTE — DISCHARGE SUMMARY
"[facility] Hospital Medicine  Discharge Summary      Patient Name: Gardenia Ferrer  MRN: [id number]  Admission Date: 1/20/2019  Hospital Length of Stay: 0 days  Discharge Date and Time:  01/25/2019 1:44 PM  Attending Physician: Kristin Reid MD   Discharging Provider: Kristin Reid MD  Primary Care Provider: [facility] Medicine Team: [facility] HOSP MED  Kristin Reid MD    HPI:   Gardenia Ferrer is a 89F with dementia, asthma, DM2, HTN, a-fib (daughter reports one episode 4 years ago) who presents to ED for witnessed syncope. Daughter at bedside provides history. She states that yesterday the patient was brought to [facility] ED for left wrist pain associated with swelling/erythema. Wrist imaging was unremarkable and the patient was discharged with Keflex. This AM the daughter gave the pt her Keflex and shortly after heard her coughing in her room. After the coughing fit, the patient vomited and then went "unresponsive" for less than a minute. Daughter states that the patient did not endorse any complaints prior to the syncopal episode. Patient has no complaints at time of my exam. No fevers at home per daughter.    Of note, in ED caregiver is tearful when discussing her abilities to take care of the patient. At this time she is interested in placement for the patient.     VSS, afebrile without leukocytosis. EKG no new ischemic changes, trop neg x 2, BNP WNL. UA unremarkable. CXR unremarkable.    * No surgery found *      Hospital Course:   Patient admitted for witnessed syncope. EKG no new ischemic changes and troponin WNL. TTE shows mod diastolic dysfunction with pEF 65%. Patient's daughter is sole caregiver and reports feeling overwhelmed at home with her mother's medical needs. She is agreeable to NH placement. Erythema to left wrist improved and itching treated with hydrocortisone cream which also improved. Patient is participating in Pt/OT. Medically " "stable.     Consults:     * Syncope    Dementia  · Witnessed episode of emesis followed by syncope; daughter reports patient "was out for less than a minute"  · suspect emesis 2/2 new abx administration for L wrist swelling; likely vasovagal sycnope following emesis  ·  trop neg x 2  · UA and CXR unremarkable  · TTE shows mild diastolic dysfx with pEF 65%  ·   · Delirium precautions:  · Avoid antihistamines, anticholinergics, hypnotics, and minimize opiates while controlling for pain as these medications may exacerbate delirium. Cues for day/night will assist with keeping patient calm and oriented - during daytime, please keep adequate light in room (open windows, lights on) and please keep room dim at night-time to encourage normal sleep-wake cycles  · continue risperidone  · Continue ropinirole for RLS     Essential hypertension    · Chronic, better controlled  · Continue losartan 100mg daily, metoprolol succinate 100mg nightly,  and amlodipine 2.5mg  · Better controlled with adding amlodipine, denies dizziness   · Monitor and adjust regimen as needed     Type 2 diabetes mellitus    · Chronic, controlled  · A1C 6  · Family reports diet controlled  · Continue to treat hyperglycemia with Low dose sliding scale     Cataracts, bilateral    · Continue home drops: difluprednate     Wrist pain, acute, left    · Pt seen at [facility]  1/19 per chart review: wrist imaging unremarkable, "suspect her pain is likely due to some sort of trauma, though infection is possibility"  · neurovascularly intact on exam; L wrist in ACE wrap  · afebrile without leukocytosis here  · continue keflex with PPI and Zofran  · PT/OT  · Keep extremity elevated  · Apply warm compresses  · Denying pain, almost resolved erythema skin intact, improved itching and scratching of site on exam, encourage not to scratch continue hydrocortisone cream     Hypomagnesemia    · Continue oral supplement       Final Active Diagnoses:    Diagnosis Date Noted POA "    PRINCIPAL PROBLEM:  Syncope [R55] 01/20/2019 Yes    Essential hypertension [I10] 01/22/2019 Yes    Wrist pain, acute, left [M25.532] 01/20/2019 Yes    Dementia [F03.90] 01/20/2019 Yes    Cataracts, bilateral [H26.9] 01/20/2019 Yes    Hypothyroid [E03.9] 01/20/2019 Yes    Type 2 diabetes mellitus [E11.9] 01/20/2019 Yes    Cardiac rhythm disorder or disturbance or change [I49.9] 04/16/2013 Yes    Hypomagnesemia [E83.42] 04/16/2013 Yes      Problems Resolved During this Admission:    Diagnosis Date Noted Date Resolved POA    Hypokalemia [E87.6] 04/16/2013 01/22/2019 Yes       Discharged Condition: good    Disposition: Skilled Nursing Facility    Follow Up:  Follow-up Information     [facility].    Specialty:  Physical Therapy  Why:  Outpatient Services  Contact information:  [address] [place] [address] [phone]                 Patient Instructions:   Follow up at Nursing facility.      Medications:  Reconciled Home Medications:      Medication List      START taking these medications    acetaminophen 325 MG tablet  Commonly known as:  TYLENOL  Take 2 tablets (650 mg total) by mouth every 4 (four) hours as needed.     amLODIPine 2.5 MG tablet  Commonly known as:  NORVASC  Take 1 tablet (2.5 mg total) by mouth once daily.  Start taking on:  1/26/2019     miconazole NITRATE 2 % 2 % top powder  Commonly known as:  MICOTIN  Apply topically 2 (two) times daily. Apply under breasts        CHANGE how you take these medications    magnesium oxide 400 mg (241.3 mg magnesium) tablet  Commonly known as:  MAG-OX  Take 1 tablet (400 mg total) by mouth 2 (two) times daily.  What changed:  when to take this        CONTINUE taking these medications    DULoxetine 20 MG capsule  Commonly known as:  CYMBALTA  Take 20 mg by mouth once daily.     DUREZOL 0.05 % Drop ophthalmic solution  Generic drug:  difluprednate  1 drop. 1 drop in Left eye evening     ferrous sulfate 325 (65 FE) MG EC  tablet  Take 325 mg by mouth 3 (three) times a week. Mon, Wed, Fri     latanoprost (PF) 0.005 % Drop  Apply to eye every evening. 1 drop in right eye at night     levothyroxine 50 MCG tablet  Commonly known as:  SYNTHROID  Take 50 mcg by mouth once daily.     loratadine 10 mg tablet  Commonly known as:  CLARITIN  Take 10 mg by mouth once daily.     losartan 100 MG tablet  Commonly known as:  COZAAR  Take 100 mg by mouth once daily.     metoprolol succinate 100 MG 24 hr tablet  Commonly known as:  TOPROL-XL  Take 100 mg by mouth once daily.     pantoprazole 40 MG tablet  Commonly known as:  PROTONIX  Take 1 tablet (40 mg total) by mouth 2 (two) times daily.     potassium chloride SA 10 MEQ tablet  Commonly known as:  K-DUR,KLOR-CON  Take 10 mEq by mouth once daily.     PROAIR HFA 90 mcg/actuation inhaler  Generic drug:  albuterol  Inhale 2 puffs into the lungs every 6 (six) hours as needed.     risperiDONE 0.25 MG Tab  Commonly known as:  RISPERDAL  Take by mouth every evening.     rOPINIRole 1 MG tablet  Commonly known as:  REQUIP  Take 1 mg by mouth 2 (two) times daily.        STOP taking these medications    cephALEXin 500 MG capsule  Commonly known as:  KEFLEX     ranitidine 150 MG tablet  Commonly known as:  ZANTAC            Indwelling Lines/Drains at time of discharge:   Lines/Drains/Airways          None          Time spent on the discharge of patient: 35 minutes  Patient was seen and examined on the date of discharge and determined to be suitable for discharge.         Kristin Reid MD  Department of Hospital Medicine  [facility]

## 2019-01-25 NOTE — ASSESSMENT & PLAN NOTE
"· Pt seen at [facility]  1/19 per chart review: wrist imaging unremarkable, "suspect her pain is likely due to some sort of trauma, though infection is possibility"  · neurovascularly intact on exam; L wrist in ACE wrap  · afebrile without leukocytosis here  · continue keflex with PPI and Zofran  · PT/OT  · Keep extremity elevated  · Apply warm compresses  · Denying pain, almost resolved erythema skin intact, improved itching and scratching of site on exam, encourage not to scratch continue hydrocortisone cream  "

## 2019-01-25 NOTE — PLAN OF CARE
Ptt has been accepted by [facility]. Nurse can call report to [phone] [facility]  Room 313. Transport setup for 5:30pm.        Victor Blanchard, LMSW [facility]   [phone]      Called [facility] to get auth for ambulance transfer. Left a VM message with the person in charge of auth's. Awaiting return phone call.    Victor Blanchard, LMSW [facility]   [phone]

## 2019-01-25 NOTE — PLAN OF CARE
01/25/19 1046   Post-Acute Status   Post-Acute Authorization Placement   Post-Acute Placement Status Delay between Facility and Payor  ([facility] not getting in contact with NH)       Called Belkis with [facility] at [phone]. Told her that the NH [facility] has accepted the patient and she is medically ready to go to the NH. Left a VM for her to call the NH.      Amor Torre, LYNDA  [facility]   [phone]

## 2019-01-28 NOTE — PLAN OF CARE
Plan is to discharge to [facility].       01/28/19 0757   Final Note   Assessment Type Final Discharge Note   Anticipated Discharge Disposition SNF   Right Care Referral Info   Post Acute Recommendation SNF / Sub-Acute Rehab   Referral Type SNF   Facility Name [facility]

## 2019-01-30 NOTE — PT/OT/SLP DISCHARGE
Physical Therapy Discharge Summary    Name: Gardenia Ferrer  MRN: [id number]   Principal Problem: Syncope     Patient Discharged from acute Physical Therapy on 19.  Please refer to prior PT noted date on 19 for functional status.     Assessment:     Patient appropriate for care in another setting.    Objective:     GOALS:   Multidisciplinary Problems     Physical Therapy Goals     Not on file          Multidisciplinary Problems (Resolved)        Problem: Physical Therapy Goal    Goal Priority Disciplines Outcome Goal Variances Interventions   Physical Therapy Goal   (Resolved)     PT, PT/OT Outcome(s) achieved     Description:  Goals to be met by: 19     Patient will increase functional independence with mobility by performin. Supine to sit with MInimal Assistance- MET   Revised goal: Supine to sit with SBA  2. Sit to supine with MInimal Assistance  3. Sit to stand transfer with Minimal Assistance  4. Bed to chair transfer with Minimal Assistance using Rolling Walker or LRD.   5. Gait  x 50 feet with Minimal Assistance using Rolling Walker. - MET  Revised goal: gait x 150 feet with CGA using RW  6. Lower extremity exercise program x20 reps per handout, with assistance as needed                       Reasons for Discontinuation of Therapy Services  Transfer to alternate level of care.      Plan:     Patient Discharged to: Skilled Nursing Facility.    Flako La, PT  2019

## 2019-02-10 NOTE — PT/OT/SLP DISCHARGE
Occupational Therapy Discharge Summary    Gardenia Ferrer  MRN: [id number]   Principal Problem: Syncope      Patient Discharged from acute Occupational Therapy on 1/25/19.  Please refer to prior OT note dated 1/25/19 for functional status.    Assessment:      Goals partially met.    Objective:     GOALS:   Multidisciplinary Problems     Occupational Therapy Goals     Not on file          Multidisciplinary Problems (Resolved)        Problem: Occupational Therapy Goal    Goal Priority Disciplines Outcome Interventions   Occupational Therapy Goal   (Resolved)     OT, PT/OT Outcome(s) achieved    Description:  Goals to be met by: 2/2/19  Patient will increase functional independence with ADLs by performing:    UE Dressing with Minimal Assistance.  LE Dressing with Minimal Assistance. - Progressing   Grooming while seated with Set-up Assistance and Stand-by Assistance.  Toileting from bedside commode with Moderate Assistance for hygiene and clothing management.   Supine to sit with Minimal Assistance.- Progressing   Toilet transfer to bedside commode with Moderate Assistance.                       Reasons for Discontinuation of Therapy Services  Transfer to alternate level of care.      Plan:     Patient Discharged to: Skilled Nursing Facility    Maranda Thomas OT  2/10/2019

## 2019-05-14 ENCOUNTER — TELEPHONE (OUTPATIENT)
Dept: AUDIOLOGY | Facility: CLINIC | Age: 84
End: 2019-05-14

## 2019-05-14 NOTE — TELEPHONE ENCOUNTER
Pt lost hearing aids purchased from another provider.  She was scheduled for a HT and an HAC to purchase replacement hearing aids.

## 2019-05-14 NOTE — TELEPHONE ENCOUNTER
----- Message from Waylon Cheek sent at 5/14/2019  9:41 AM CDT -----  Contact: Neweber [facility]/[phone]  Type: Patient Call Back    Who called:Tenzin    What is the request in detail:Marcoeber would like to speak to the staff regarding hearing aids.    Would the patient rather a call back or a response via [facility]? Call back    Best call back number:[phone]        Thank you

## 2019-05-22 ENCOUNTER — CLINICAL SUPPORT (OUTPATIENT)
Dept: AUDIOLOGY | Facility: CLINIC | Age: 84
End: 2019-05-22
Payer: MEDICARE

## 2019-05-22 DIAGNOSIS — H90.3 SENSORINEURAL HEARING LOSS, BILATERAL: Primary | ICD-10-CM

## 2019-05-22 PROCEDURE — 92550 TYMPANOMETRY & REFLEX THRESH: CPT | Mod: S$GLB,,, | Performed by: AUDIOLOGIST

## 2019-05-22 PROCEDURE — 92557 PR COMPREHENSIVE HEARING TEST: ICD-10-PCS | Mod: S$GLB,,, | Performed by: AUDIOLOGIST

## 2019-05-22 PROCEDURE — 92550 PR TYMPANOMETRY AND REFLEX THRESHOLD MEASUREMENTS: ICD-10-PCS | Mod: S$GLB,,, | Performed by: AUDIOLOGIST

## 2019-05-22 PROCEDURE — 92557 COMPREHENSIVE HEARING TEST: CPT | Mod: S$GLB,,, | Performed by: AUDIOLOGIST

## 2019-05-22 NOTE — PROGRESS NOTES
Click on link to view Audiogram  Document on 5/22/2019 10:19 AM by Charlene Ferro MA CCC-A: Audiogram    Patient seen today for Audiogram with complaint of hearing loss.  Patient denies tinnitus and dizziness.  Patient had hearing aids and the home lost her hearing aids and will be buying her new hearing aids.  Otoscopy revealed a mild amount of cerumen with clear tympanic membrane AU.  Impedance revealed a Type A tymp in the right ear and a Type As tymp in the left ear with absent acoustic reflexes AU.  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  Hearing aid pricing along with a copy of the Audiogram was sent to the home with the patient.  Measurements were made for the hearing aids (#1  length and P5 color) and I will order the aids once the home gives approval.

## 2019-06-12 ENCOUNTER — TELEPHONE (OUTPATIENT)
Dept: AUDIOLOGY | Facility: CLINIC | Age: 84
End: 2019-06-12

## 2019-06-12 NOTE — TELEPHONE ENCOUNTER
----- Message from Kelly Saldivar sent at 6/12/2019 11:18 AM CDT -----  ..Type: Patient Call Back    Who called:Tenzin    What is the request in detail: Rep declined to leave detalied message for reason for the call.     Can the clinic reply by [facility]? No     Would the patient rather a call back or a response via [facility]? Call back     Best call back number: [phone]    Additional Information:

## 2019-06-12 NOTE — TELEPHONE ENCOUNTER
Spoke with Tenzin and they are ready to order the hearing aids.  The nursing home will be paying for the hearing aids and approved the cost for the Basic technology line.  I will contact Tenzin when aids arrive and will set up a HAE.    ----- Message from Meli Farmer sent at 6/12/2019  1:21 PM CDT -----  Contact: Rep-Newanda  Type:  Patient Returning Call    Who Called: Rep-Tenzin    Who Left Message for Patient: Charlene    Does the patient know what this is regarding?:not sure    Would the patient rather a call back or a response via [facility]? Call back    Best Call Back Number:[phone]    Additional Information:

## 2019-06-21 ENCOUNTER — TELEPHONE (OUTPATIENT)
Dept: AUDIOLOGY | Facility: CLINIC | Age: 84
End: 2019-06-21

## 2019-07-02 ENCOUNTER — TELEPHONE (OUTPATIENT)
Dept: AUDIOLOGY | Facility: CLINIC | Age: 84
End: 2019-07-02

## 2019-07-02 NOTE — TELEPHONE ENCOUNTER
Spoke with Tenzin from [facility].  She inquired as to wether pt needs to pay in full for the hearing aids at the time of the fitting.  She was told she did need to pay in full.  If funding is not obtained in time the fitting appt will be rescheduled from tomorrow.

## 2019-07-02 NOTE — TELEPHONE ENCOUNTER
----- Message from Brandy Ramírez sent at 7/2/2019 12:25 PM CDT -----  Contact: elfego with [facility] [phone]  Type: Patient Call Back    Who called:elfego with [facility] [phone]    What is the request in detail:calling in regards to hearing aids. Call pt    Can the clinic reply by [facility]?    Would the patient rather a call back or a response via [facility]? Call back    Best call back number:elfego with [facility] [phone]    Additional Information:

## 2019-07-03 ENCOUNTER — TELEPHONE (OUTPATIENT)
Dept: AUDIOLOGY | Facility: CLINIC | Age: 84
End: 2019-07-03

## 2019-07-03 NOTE — TELEPHONE ENCOUNTER
Lashanda Arias called in regards to payment for the hearing aids.  She needs a copy of the purchase agreement with price information.  I faxed this to her and she asked to reschedule MsTessie Carissa's appt to 7/15/19 to allow time to get the payment.

## 2019-07-09 ENCOUNTER — TELEPHONE (OUTPATIENT)
Dept: AUDIOLOGY | Facility: CLINIC | Age: 84
End: 2019-07-09

## 2019-07-09 NOTE — TELEPHONE ENCOUNTER
Left message for Marcoeber letting her know the patient has an appointment 7/15/19.    ----- Message from GUANACO Harris sent at 7/8/2019  4:05 PM CDT -----  Contact: Tenzin/ [facility]/ [phone]  Charlene,      This came to me.  Sorry I didn't pass it along sooner.      Rachael  ----- Message -----  From: Stephanie Sims  Sent: 7/8/2019  11:59 AM  To: GUANACO Harris    Type: Patient Call Back    Who called:  Nithya    What is the request in detail:  Tenzin would like a call back regarding patients hearing aids.  Thank you    Would the patient rather a call back or a response via [facility]?  Call back    Best call back number:   [phone]

## 2019-07-15 ENCOUNTER — CLINICAL SUPPORT (OUTPATIENT)
Dept: AUDIOLOGY | Facility: CLINIC | Age: 84
End: 2019-07-15
Payer: MEDICARE

## 2019-07-15 DIAGNOSIS — H90.3 SENSORINEURAL HEARING LOSS, BILATERAL: Primary | ICD-10-CM

## 2019-07-15 PROCEDURE — 99499 NO LOS: ICD-10-PCS | Mod: S$GLB,,, | Performed by: AUDIOLOGIST

## 2019-07-15 PROCEDURE — 99499 UNLISTED E&M SERVICE: CPT | Mod: S$GLB,,, | Performed by: AUDIOLOGIST

## 2019-07-15 NOTE — PROGRESS NOTES
Patient seen today for Hearing Aid fitting.  Patient fit with Phonak Audeo M50-R aids both ears.  Real ear measurements and FB manager completed.  Good fit/good subjective benefit.  The patient will not be removing and inserting the aids, this will be done by the nurses at the home.  I showed Newanda how to remove the aids and place them into the  and remove them from the .  Instruction manual given along with my card so the nurses can contact me with any questions.  Follow up will be conducted via phone call and we will schedule the patient to come in for an appointment if she is having any complications with the hearing aids.  Patient paid in full for the hearing aids.

## 2021-07-01 ENCOUNTER — PATIENT MESSAGE (OUTPATIENT)
Dept: ADMINISTRATIVE | Facility: OTHER | Age: 86
End: 2021-07-01